# Patient Record
Sex: FEMALE | Race: WHITE | URBAN - METROPOLITAN AREA
[De-identification: names, ages, dates, MRNs, and addresses within clinical notes are randomized per-mention and may not be internally consistent; named-entity substitution may affect disease eponyms.]

---

## 2021-03-15 ENCOUNTER — INPATIENT (INPATIENT)
Facility: HOSPITAL | Age: 71
LOS: 7 days | Discharge: HOME HEALTH | DRG: 853 | End: 2021-03-23
Attending: INTERNAL MEDICINE | Admitting: INTERNAL MEDICINE
Payer: MEDICARE

## 2021-03-15 VITALS
OXYGEN SATURATION: 97 % | HEART RATE: 135 BPM | WEIGHT: 179.9 LBS | RESPIRATION RATE: 18 BRPM | HEIGHT: 63 IN | SYSTOLIC BLOOD PRESSURE: 105 MMHG | DIASTOLIC BLOOD PRESSURE: 64 MMHG | TEMPERATURE: 98 F

## 2021-03-15 DIAGNOSIS — I48.91 UNSPECIFIED ATRIAL FIBRILLATION: ICD-10-CM

## 2021-03-15 DIAGNOSIS — A41.9 SEPSIS, UNSPECIFIED ORGANISM: ICD-10-CM

## 2021-03-15 LAB
ALBUMIN SERPL ELPH-MCNC: 2.4 G/DL — LOW (ref 3.3–5)
ALP SERPL-CCNC: 94 U/L — SIGNIFICANT CHANGE UP (ref 40–120)
ALT FLD-CCNC: 45 U/L — SIGNIFICANT CHANGE UP (ref 12–78)
ANION GAP SERPL CALC-SCNC: 13 MMOL/L — SIGNIFICANT CHANGE UP (ref 5–17)
APPEARANCE UR: CLEAR — SIGNIFICANT CHANGE UP
APTT BLD: 29.2 SEC — SIGNIFICANT CHANGE UP (ref 27.5–35.5)
AST SERPL-CCNC: 83 U/L — HIGH (ref 15–37)
BASOPHILS # BLD AUTO: 0.05 K/UL — SIGNIFICANT CHANGE UP (ref 0–0.2)
BASOPHILS NFR BLD AUTO: 0.3 % — SIGNIFICANT CHANGE UP (ref 0–2)
BILIRUB SERPL-MCNC: 1.2 MG/DL — SIGNIFICANT CHANGE UP (ref 0.2–1.2)
BILIRUB UR-MCNC: NEGATIVE — SIGNIFICANT CHANGE UP
BUN SERPL-MCNC: 14 MG/DL — SIGNIFICANT CHANGE UP (ref 7–23)
CALCIUM SERPL-MCNC: 8.7 MG/DL — SIGNIFICANT CHANGE UP (ref 8.5–10.1)
CHLORIDE SERPL-SCNC: 93 MMOL/L — LOW (ref 96–108)
CO2 SERPL-SCNC: 22 MMOL/L — SIGNIFICANT CHANGE UP (ref 22–31)
COLOR SPEC: YELLOW — SIGNIFICANT CHANGE UP
CREAT SERPL-MCNC: 1.68 MG/DL — HIGH (ref 0.5–1.3)
DIFF PNL FLD: NEGATIVE — SIGNIFICANT CHANGE UP
EOSINOPHIL # BLD AUTO: 0 K/UL — SIGNIFICANT CHANGE UP (ref 0–0.5)
EOSINOPHIL NFR BLD AUTO: 0 % — SIGNIFICANT CHANGE UP (ref 0–6)
GLUCOSE SERPL-MCNC: 143 MG/DL — HIGH (ref 70–99)
GLUCOSE UR QL: NEGATIVE MG/DL — SIGNIFICANT CHANGE UP
HCT VFR BLD CALC: 43.1 % — SIGNIFICANT CHANGE UP (ref 34.5–45)
HGB BLD-MCNC: 14.5 G/DL — SIGNIFICANT CHANGE UP (ref 11.5–15.5)
IMM GRANULOCYTES NFR BLD AUTO: 1 % — SIGNIFICANT CHANGE UP (ref 0–1.5)
INR BLD: 1.63 RATIO — HIGH (ref 0.88–1.16)
KETONES UR-MCNC: NEGATIVE — SIGNIFICANT CHANGE UP
LEUKOCYTE ESTERASE UR-ACNC: NEGATIVE — SIGNIFICANT CHANGE UP
LYMPHOCYTES # BLD AUTO: 0.52 K/UL — LOW (ref 1–3.3)
LYMPHOCYTES # BLD AUTO: 2.9 % — LOW (ref 13–44)
MCHC RBC-ENTMCNC: 30.6 PG — SIGNIFICANT CHANGE UP (ref 27–34)
MCHC RBC-ENTMCNC: 33.6 GM/DL — SIGNIFICANT CHANGE UP (ref 32–36)
MCV RBC AUTO: 90.9 FL — SIGNIFICANT CHANGE UP (ref 80–100)
MONOCYTES # BLD AUTO: 0.42 K/UL — SIGNIFICANT CHANGE UP (ref 0–0.9)
MONOCYTES NFR BLD AUTO: 2.3 % — SIGNIFICANT CHANGE UP (ref 2–14)
NEUTROPHILS # BLD AUTO: 16.74 K/UL — HIGH (ref 1.8–7.4)
NEUTROPHILS NFR BLD AUTO: 93.5 % — HIGH (ref 43–77)
NITRITE UR-MCNC: NEGATIVE — SIGNIFICANT CHANGE UP
PH UR: 6.5 — SIGNIFICANT CHANGE UP (ref 5–8)
PLATELET # BLD AUTO: 333 K/UL — SIGNIFICANT CHANGE UP (ref 150–400)
POTASSIUM SERPL-MCNC: 3.8 MMOL/L — SIGNIFICANT CHANGE UP (ref 3.5–5.3)
POTASSIUM SERPL-SCNC: 3.8 MMOL/L — SIGNIFICANT CHANGE UP (ref 3.5–5.3)
PROT SERPL-MCNC: 8 GM/DL — SIGNIFICANT CHANGE UP (ref 6–8.3)
PROT UR-MCNC: NEGATIVE MG/DL — SIGNIFICANT CHANGE UP
PROTHROM AB SERPL-ACNC: 18.6 SEC — HIGH (ref 10.6–13.6)
RBC # BLD: 4.74 M/UL — SIGNIFICANT CHANGE UP (ref 3.8–5.2)
RBC # FLD: 13.2 % — SIGNIFICANT CHANGE UP (ref 10.3–14.5)
SARS-COV-2 RNA SPEC QL NAA+PROBE: SIGNIFICANT CHANGE UP
SODIUM SERPL-SCNC: 128 MMOL/L — LOW (ref 135–145)
SP GR SPEC: 1 — LOW (ref 1.01–1.02)
TSH SERPL-MCNC: 2.66 UU/ML — SIGNIFICANT CHANGE UP (ref 0.34–4.82)
UROBILINOGEN FLD QL: NEGATIVE MG/DL — SIGNIFICANT CHANGE UP
WBC # BLD: 17.91 K/UL — HIGH (ref 3.8–10.5)
WBC # FLD AUTO: 17.91 K/UL — HIGH (ref 3.8–10.5)

## 2021-03-15 PROCEDURE — 99291 CRITICAL CARE FIRST HOUR: CPT | Mod: CS

## 2021-03-15 PROCEDURE — 88173 CYTOPATH EVAL FNA REPORT: CPT

## 2021-03-15 PROCEDURE — 71260 CT THORAX DX C+: CPT

## 2021-03-15 PROCEDURE — 97116 GAIT TRAINING THERAPY: CPT | Mod: GP

## 2021-03-15 PROCEDURE — 93306 TTE W/DOPPLER COMPLETE: CPT

## 2021-03-15 PROCEDURE — 71045 X-RAY EXAM CHEST 1 VIEW: CPT | Mod: 26

## 2021-03-15 PROCEDURE — 86769 SARS-COV-2 COVID-19 ANTIBODY: CPT

## 2021-03-15 PROCEDURE — 71045 X-RAY EXAM CHEST 1 VIEW: CPT

## 2021-03-15 PROCEDURE — 76856 US EXAM PELVIC COMPLETE: CPT

## 2021-03-15 PROCEDURE — 88305 TISSUE EXAM BY PATHOLOGIST: CPT

## 2021-03-15 PROCEDURE — 81003 URINALYSIS AUTO W/O SCOPE: CPT

## 2021-03-15 PROCEDURE — 76830 TRANSVAGINAL US NON-OB: CPT

## 2021-03-15 PROCEDURE — 82378 CARCINOEMBRYONIC ANTIGEN: CPT

## 2021-03-15 PROCEDURE — 86803 HEPATITIS C AB TEST: CPT

## 2021-03-15 PROCEDURE — 74183 MRI ABD W/O CNTR FLWD CNTR: CPT

## 2021-03-15 PROCEDURE — 86301 IMMUNOASSAY TUMOR CA 19-9: CPT

## 2021-03-15 PROCEDURE — 87086 URINE CULTURE/COLONY COUNT: CPT

## 2021-03-15 PROCEDURE — 99223 1ST HOSP IP/OBS HIGH 75: CPT

## 2021-03-15 PROCEDURE — U0005: CPT

## 2021-03-15 PROCEDURE — A9579: CPT

## 2021-03-15 PROCEDURE — 84145 PROCALCITONIN (PCT): CPT

## 2021-03-15 PROCEDURE — 83735 ASSAY OF MAGNESIUM: CPT

## 2021-03-15 PROCEDURE — 80048 BASIC METABOLIC PNL TOTAL CA: CPT

## 2021-03-15 PROCEDURE — 10009 FNA BX W/CT GDN 1ST LES: CPT

## 2021-03-15 PROCEDURE — 80053 COMPREHEN METABOLIC PANEL: CPT

## 2021-03-15 PROCEDURE — 87070 CULTURE OTHR SPECIMN AEROBIC: CPT

## 2021-03-15 PROCEDURE — 36415 COLL VENOUS BLD VENIPUNCTURE: CPT

## 2021-03-15 PROCEDURE — C1769: CPT

## 2021-03-15 PROCEDURE — 74177 CT ABD & PELVIS W/CONTRAST: CPT | Mod: 26

## 2021-03-15 PROCEDURE — 93010 ELECTROCARDIOGRAM REPORT: CPT

## 2021-03-15 PROCEDURE — 83605 ASSAY OF LACTIC ACID: CPT

## 2021-03-15 PROCEDURE — 86304 IMMUNOASSAY TUMOR CA 125: CPT

## 2021-03-15 PROCEDURE — 36597 REPOSITION VENOUS CATHETER: CPT

## 2021-03-15 PROCEDURE — 0031A: CPT

## 2021-03-15 PROCEDURE — 85025 COMPLETE CBC W/AUTO DIFF WBC: CPT

## 2021-03-15 PROCEDURE — U0003: CPT

## 2021-03-15 PROCEDURE — 85027 COMPLETE CBC AUTOMATED: CPT

## 2021-03-15 PROCEDURE — 76000 FLUOROSCOPY <1 HR PHYS/QHP: CPT

## 2021-03-15 PROCEDURE — 97161 PT EVAL LOW COMPLEX 20 MIN: CPT | Mod: GP

## 2021-03-15 PROCEDURE — 74177 CT ABD & PELVIS W/CONTRAST: CPT

## 2021-03-15 PROCEDURE — 87040 BLOOD CULTURE FOR BACTERIA: CPT

## 2021-03-15 RX ORDER — SOD SULF/SODIUM/NAHCO3/KCL/PEG
2000 SOLUTION, RECONSTITUTED, ORAL ORAL ONCE
Refills: 0 | Status: COMPLETED | OUTPATIENT
Start: 2021-03-15 | End: 2021-03-15

## 2021-03-15 RX ORDER — PANTOPRAZOLE SODIUM 20 MG/1
40 TABLET, DELAYED RELEASE ORAL ONCE
Refills: 0 | Status: DISCONTINUED | OUTPATIENT
Start: 2021-03-15 | End: 2021-03-15

## 2021-03-15 RX ORDER — SODIUM CHLORIDE 9 MG/ML
1000 INJECTION INTRAMUSCULAR; INTRAVENOUS; SUBCUTANEOUS
Refills: 0 | Status: DISCONTINUED | OUTPATIENT
Start: 2021-03-15 | End: 2021-03-15

## 2021-03-15 RX ORDER — ONDANSETRON 8 MG/1
4 TABLET, FILM COATED ORAL EVERY 6 HOURS
Refills: 0 | Status: DISCONTINUED | OUTPATIENT
Start: 2021-03-15 | End: 2021-03-23

## 2021-03-15 RX ORDER — METOPROLOL TARTRATE 50 MG
25 TABLET ORAL
Refills: 0 | Status: DISCONTINUED | OUTPATIENT
Start: 2021-03-15 | End: 2021-03-15

## 2021-03-15 RX ORDER — SODIUM CHLORIDE 9 MG/ML
1000 INJECTION INTRAMUSCULAR; INTRAVENOUS; SUBCUTANEOUS
Refills: 0 | Status: DISCONTINUED | OUTPATIENT
Start: 2021-03-15 | End: 2021-03-17

## 2021-03-15 RX ORDER — SODIUM CHLORIDE 9 MG/ML
2500 INJECTION, SOLUTION INTRAVENOUS ONCE
Refills: 0 | Status: COMPLETED | OUTPATIENT
Start: 2021-03-15 | End: 2021-03-15

## 2021-03-15 RX ORDER — PIPERACILLIN AND TAZOBACTAM 4; .5 G/20ML; G/20ML
3.38 INJECTION, POWDER, LYOPHILIZED, FOR SOLUTION INTRAVENOUS ONCE
Refills: 0 | Status: COMPLETED | OUTPATIENT
Start: 2021-03-15 | End: 2021-03-15

## 2021-03-15 RX ADMIN — SODIUM CHLORIDE 2500 MILLILITER(S): 9 INJECTION, SOLUTION INTRAVENOUS at 12:59

## 2021-03-15 RX ADMIN — Medication 2000 MILLILITER(S): at 20:54

## 2021-03-15 RX ADMIN — PIPERACILLIN AND TAZOBACTAM 200 GRAM(S): 4; .5 INJECTION, POWDER, LYOPHILIZED, FOR SOLUTION INTRAVENOUS at 14:40

## 2021-03-15 RX ADMIN — SODIUM CHLORIDE 100 MILLILITER(S): 9 INJECTION INTRAMUSCULAR; INTRAVENOUS; SUBCUTANEOUS at 17:11

## 2021-03-15 NOTE — CONSULT NOTE ADULT - SUBJECTIVE AND OBJECTIVE BOX
HPI:  71 y/o female with a PMHx of Crohns presents to the ED for evaluation. Pt reports having cough, n/v/d and feeling fatigued over the last few days. Pt fearful of being admitted and doesn't have a PCP; her  tells me he made her come, due to the fact that she was v weak, not eating and he was concerned.  Pt has new onset AF was RVR when she arrived and I believe this was the main reason her her feeling weak, she needs a CT abd to r/o IBD flare, doubt any ischemic colitis ( no blood with the diarrhea, but in view of the new AF worth ruling out).   Case d/w ; pt hasn't been on any meds for years, doesn't have a GI I can call.  Pt tells me she stopped eating weeks ago. She is clearly dehydrated    (15 Mar 2021 14:35)  -----------------  No bleeding  Last colonoscopy 12 years ago with Dr. Barragan.    PAST MEDICAL & SURGICAL HISTORY:  Crohn&#x27;s disease        Home Medications:  Refresh ophthalmic solution: 1 drop(s) to each affected eye once a day, As Needed - for dry eyes (15 Mar 2021 14:38)      MEDICATIONS  (STANDING):  polyethylene glycol/electrolyte Solution 2000 milliLiter(s) Oral once  sodium chloride 0.9%. 1000 milliLiter(s) (100 mL/Hr) IV Continuous <Continuous>    MEDICATIONS  (PRN):  aluminum hydroxide/magnesium hydroxide/simethicone Suspension 30 milliLiter(s) Oral every 4 hours PRN Dyspepsia  ondansetron Injectable 4 milliGRAM(s) IV Push every 6 hours PRN Nausea      Allergies    No Known Allergies    Intolerances        SOCIAL HISTORY:    FAMILY HISTORY:      ROS  As above  Otherwise unremarkable    Vital Signs Last 24 Hrs  T(C): 37.1 (15 Mar 2021 16:12), Max: 37.1 (15 Mar 2021 16:12)  T(F): 98.7 (15 Mar 2021 16:12), Max: 98.7 (15 Mar 2021 16:12)  HR: 111 (15 Mar 2021 16:12) (111 - 135)  BP: 104/74 (15 Mar 2021 16:12) (94/65 - 105/64)  BP(mean): 84 (15 Mar 2021 16:12) (70 - 84)  RR: 15 (15 Mar 2021 14:36) (15 - 29)  SpO2: 92% (15 Mar 2021 14:36) (92% - 97%)    Constitutional: NAD, well-developed  Respiratory: CTAB  Cardiovascular: S1 and S2, RRR  Gastrointestinal: BS+, soft, NT/ND  Extremities: No peripheral edema  Psychiatric: Normal mood, normal affect  Skin: No rashes    LABS:                        14.5   17.91 )-----------( 333      ( 15 Mar 2021 12:48 )             43.1     03-15    128<L>  |  93<L>  |  14  ----------------------------<  143<H>  3.8   |  22  |  1.68<H>    Ca    8.7      15 Mar 2021 12:48    TPro  8.0  /  Alb  2.4<L>  /  TBili  1.2  /  DBili  x   /  AST  83<H>  /  ALT  45  /  AlkPhos  94  03-15    PT/INR - ( 15 Mar 2021 12:48 )   PT: 18.6 sec;   INR: 1.63 ratio         PTT - ( 15 Mar 2021 12:48 )  PTT:29.2 sec  LIVER FUNCTIONS - ( 15 Mar 2021 12:48 )  Alb: 2.4 g/dL / Pro: 8.0 gm/dL / ALK PHOS: 94 U/L / ALT: 45 U/L / AST: 83 U/L / GGT: x             RADIOLOGY & ADDITIONAL STUDIES: [Awake] : awake [Alert] : alert [Soft] : soft [Oriented x3] : oriented to person, place, and time [No Bleeding] : there was no active vaginal bleeding [Normal] : uterus [Uterine Adnexae] : were not tender and not enlarged [No Tenderness] : no rectal tenderness [Nl Sphincter Tone] : normal sphincter tone [Acute Distress] : no acute distress [Mass] : no breast mass [Nipple Discharge] : no nipple discharge [Tender] : non tender [Axillary LAD] : no axillary lymphadenopathy

## 2021-03-15 NOTE — ED PROVIDER NOTE - PHYSICAL EXAMINATION
Constitutional: Elderly female laying n bed in mild distress AAOx3  Eyes: PERRLA EOMI  Head: Normocephalic atraumatic  Mouth: MMM. Oropharynx dry  Cardiac: regular rate   Resp: Lungs CTAB  GI: Abd s/nt/nd, no rebound or guarding.  Neuro: awake, alert, moving all extremities, cranial nerves 2-12 intact, sensation intact, no dysmetria.  Skin: No rashes

## 2021-03-15 NOTE — ED ADULT NURSE NOTE - OBJECTIVE STATEMENT
pt BIBA c/o n/v/d x2wks. pt denies abd pain, chest pain, fevers at home, and SOB. pt AOx4, breathing symmetrical and unlabored, #20 IV inserted into L. AC, bloods drawn and sent to lab, EKG completed, IVF infusing as ordered, cardiac monitoring in place, pt in no acute distress at this time.

## 2021-03-15 NOTE — H&P ADULT - HISTORY OF PRESENT ILLNESS
69 y/o female with a PMHx of Crohns presents to the ED for evaluation. Pt reports having cough, n/v/d and feeling fatigued over the last few days. Denies fevers. No recent abx use. No hx of Cdiff. No known sick contacts. No other complaints at this time.   71 y/o female with a PMHx of Crohns presents to the ED for evaluation. Pt reports having cough, n/v/d and feeling fatigued over the last few days. Pt fearful of being admitted and doesn't have a PCP; her  tells me he made her come, due to the fact that she was v weak, not eating and he was concerned.  Pt has new onset AF was RVR when she arrived and I believe this was the main reason her her feeling weak, she needs a CT abd to r/o IBD flare, doubt any ischemic colitis ( no blood with the diarrhea, but in view of the new AF worth ruling out).   Case d/w ; pt hasn't been on any meds for years, doesn't have a GI I can call.  Pt tells me she stopped eating weeks ago. She is clearly dehydrated

## 2021-03-15 NOTE — H&P ADULT - ASSESSMENT
* Weakness, nausea, diarrhea, abdominal discomfort not pain  r/o Crohn's exacerbation vs ischemic colitis  CT abd PO contrast only  c/w Zosyn for now  check ESR if elevated this could be Crohn's  IVF, rate at 80 in case there is CHF component    * New onset AF, vs chronic undiagnosed patient doesn't have a PCP; intermittent RVR  suspect dehydration to contribute to it  start low dose BB if rate persistently high  TTE  cardio consult  decide about the AC if HH stable  pt looks very dehydrated to see HH in am I am sure HH will drop after IVF    * HILARY, hyponatremia sec to dehydration  NS@80    Code status not addressed pt is too anxious for me to start discussion at this time

## 2021-03-15 NOTE — CONSULT NOTE ADULT - SUBJECTIVE AND OBJECTIVE BOX
HPI:  69 y/o female with a PMHx of Crohns presents to the ED for evaluation. Pt reports having cough, n/v/d and feeling fatigued over the last few days. Pt fearful of being admitted and doesn't have a PCP; her  tells me he made her come, due to the fact that she was v weak, not eating and he was concerned.  Pt has new onset AF was RVR when she arrived and I believe this was the main reason her her feeling weak, she needs a CT abd to r/o IBD flare, doubt any ischemic colitis ( no blood with the diarrhea, but in view of the new AF worth ruling out).   Case d/w ; pt hasn't been on any meds for years, doesn't have a GI I can call.  Pt tells me she stopped eating weeks ago. She is clearly dehydrated    (15 Mar 2021 14:35)    admitted for nausea, vomiting, weakness  concerning for Crohn's exacerbation v. ischemic colitis.  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  Will likely need colonoscopy.  Consulted for newly diagnosed afib on ECG on admit.  Pt has mild RVR in 110s w/ afib may be due to acute illness.  Reports fatigue but no dyspnea, chest pain,.  Some palpitations intermittent lasting minutes every few days on questioning.  No chest pain.    PAST MEDICAL AND SURGICAL HISTORY:  Crohn&#x27;s disease    ALLERGIES:  Allergies  No Known Allergies  Intolerances    SOCIAL HISTORY:  lives with , neg smoking, etoh, idu (15 Mar 2021 14:35)      FAMILY  HISTORY:  neg      MEDICATIONS:  OUTPATIENT:  Home Medications:  Refresh ophthalmic solution: 1 drop(s) to each affected eye once a day, As Needed - for dry eyes (15 Mar 2021 14:38)        INPATIENT:  MEDICATIONS  (STANDING):  sodium chloride 0.9%. 1000 milliLiter(s) (100 mL/Hr) IV Continuous <Continuous>    MEDICATIONS  (PRN):  aluminum hydroxide/magnesium hydroxide/simethicone Suspension 30 milliLiter(s) Oral every 4 hours PRN Dyspepsia  ondansetron Injectable 4 milliGRAM(s) IV Push every 6 hours PRN Nausea    MEDICATIONS  (PRN):  aluminum hydroxide/magnesium hydroxide/simethicone Suspension 30 milliLiter(s) Oral every 4 hours PRN Dyspepsia  ondansetron Injectable 4 milliGRAM(s) IV Push every 6 hours PRN Nausea    REVIEW OF SYSTEMS:    CONSTITUTIONAL: No weakness, fevers or chills  EYES/ENT: No visual changes;  No vertigo or throat pain   NECK: No pain or stiffness  RESPIRATORY: No cough, wheezing, hemoptysis; No shortness of breath  CARDIOVASCULAR: No chest pain or palpitations  GASTROINTESTINAL: No abdominal or epigastric pain. No nausea, vomiting, or hematemesis; No diarrhea or constipation. No melena or hematochezia.  GENITOURINARY: No dysuria, frequency or hematuria  NEUROLOGICAL: No numbness or weakness  SKIN: No itching, burning, rashes, or lesions   All other review of systems is negative unless indicated above    Vital Signs Last 24 Hrs  T(C): 37.1 (15 Mar 2021 16:12), Max: 37.1 (15 Mar 2021 16:12)  T(F): 98.7 (15 Mar 2021 16:12), Max: 98.7 (15 Mar 2021 16:12)  HR: 111 (15 Mar 2021 16:12) (111 - 135)  BP: 104/74 (15 Mar 2021 16:12) (94/65 - 105/64)  BP(mean): 84 (15 Mar 2021 16:12) (70 - 84)  RR: 15 (15 Mar 2021 14:36) (15 - 29)  SpO2: 92% (15 Mar 2021 14:36) (92% - 97%)    I&O's Summary      PHYSICAL EXAM:    Constitutional: NAD, awake and alert,   HEENT: PERR, EOMI,  No oral cyananosis.  Neck:  supple,  No JVD  Respiratory: Breath sounds are clear bilaterally, No wheezing, rales or rhonchi  Cardiovascular: S1 and S2, iregular rate and rhythm, no Murmurs, gallops or rubs  Gastrointestinal: Bowel Sounds present, soft, nontender.   Extremities: No peripheral edema. No clubbing or cyanosis.  Vascular: 2+ peripheral pulses  Neurological: A/O x 3, no focal deficits        LABS: All Labs Reviewed:                        14.5   17.91 )-----------( 333      ( 15 Mar 2021 12:48 )             43.1     15 Mar 2021 12:48    128    |  93     |  14     ----------------------------<  143    3.8     |  22     |  1.68     Ca    8.7        15 Mar 2021 12:48    TPro  8.0    /  Alb  2.4    /  TBili  1.2    /  DBili  x      /  AST  83     /  ALT  45     /  AlkPhos  94     15 Mar 2021 12:48    PT/INR - ( 15 Mar 2021 12:48 )   PT: 18.6 sec;   INR: 1.63 ratio         PTT - ( 15 Mar 2021 12:48 )  PTT:29.2 sec      Blood Culture:       ===============================  EKG:  < from: 12 Lead ECG (03.15.21 @ 12:36) >  Diagnosis Line Atrial fibrillation with rapid ventricular response  ST & T wave abnormality, consider inferior ischemia  Abnormal ECG  When compared with ECG of 15-MAR-2021 12:35,  Nonspecific T wave abnormality has replaced inverted T waves in Lateral leads  Confirmed by MD CASPER, YOLY ([id number]) on 3/15/2021 3:50:56 PM    < end of copied text >  ===============================  RADIOLOGY:  < from: Xray Chest 1 View- PORTABLE-Urgent (Xray Chest 1 View- PORTABLE-Urgent .) (03.15.21 @ 13:32) >  Comparison: 03/15/2021    AP radiograph of the chest demonstrates no evidence of infiltrate, pleural effusion or vascular congestion. No atelectasis is seen. The cardiac silhouette is mildly enlarged in size. Osseous structures are intact.    Impression: No active pulmonary disease. Mild cardiomegaly.    JANIS COSTA MD; AttendingRadiologist  This document has been electronically signed. Mar 15 2021  4:57PM    < end of copied text >    < from: CT Abdomen and Pelvis w/ IV Cont (03.15.21 @ 14:27) >    IMPRESSION:  Soft tissue mass centered around the right adnexa appears to be emanating from the appendix/cecum and secondarily involving the right adnexa concerning for neoplasm which is likely appendiceal/cecal in origin versus right ovarian. Consideration for colonoscopy for further evaluation.    Indeterminate hepatic lesion for which MRI is recommendedfor further evaluation. Right paracolic gutter peritoneal nodularity for which carcinomatosis cannot be excluded.    ===============================

## 2021-03-15 NOTE — CONSULT NOTE ADULT - PROBLEM SELECTOR RECOMMENDATION 9
newly diagnosed.  CHADS2-VASc=1-2 (>65,F).  Check Echo. Check TSH.  Check noctural O2 sats given BMI 31.9.  No ETOHism.  HRs 110s may be related to acute illness, OK to hold BB for now.  IM team hold anticoag pending trend of H&H, would favor long term initiation of anticoag given CHADS2-VASc>1.

## 2021-03-15 NOTE — ED PROVIDER NOTE - CLINICAL SUMMARY MEDICAL DECISION MAKING FREE TEXT BOX
69 y/o female with hx of Crohns presents with n/v/d and weakness. Differential includes, colitis, gastroenteritis, Crohns flare, COVID, Plan: labs, rehydrate, CT abd/pelvis, chest XR, COVID swab, reassess closely.

## 2021-03-15 NOTE — H&P ADULT - NSHPPHYSICALEXAM_GEN_ALL_CORE
Constitutional: Elderly female laying n bed in mild distress AAOx3  Eyes: PERRLA EOMI  Head: Normocephalic atraumatic  Mouth: MMM. Oropharynx dry  Cardiac: irregular s1s2 no m/g/r  Resp: Lungs CTAB  GI: Abd s/nt/nd, no rebound or guarding.  Neuro: awake, alert, moving all extremities, cranial nerves 2-12 intact, sensation intact, no dysmetria.  Skin: No rashes normal...

## 2021-03-15 NOTE — H&P ADULT - NSHPLABSRESULTS_GEN_ALL_CORE
14.5   17.91 )-----------( 333      ( 15 Mar 2021 12:48 )             43.1   03-15    128<L>  |  93<L>  |  14  ----------------------------<  143<H>  3.8   |  22  |  1.68<H>    Ca    8.7      15 Mar 2021 12:48    TPro  8.0  /  Alb  2.4<L>  /  TBili  1.2  /  DBili  x   /  AST  83<H>  /  ALT  45  /  AlkPhos  94  03-15

## 2021-03-15 NOTE — ED ADULT TRIAGE NOTE - CHIEF COMPLAINT QUOTE
pt presents to ED with complaints of lethargy, weakness, and general feeling of being unwell. pt has hx of Crohns and having a flare x 2 weeks.

## 2021-03-15 NOTE — ED PROVIDER NOTE - OBJECTIVE STATEMENT
71 y/o female with a PMHx of Crohns presents to the ED for evaluation. Pt reports having cough, n/v/d and feeling fatigued over the last few days. Denies fevers. No recent abx use. No hx of Cdiff. No known sick contacts. No other complaints at this time.

## 2021-03-15 NOTE — ED PROVIDER NOTE - CARE PLAN
Principal Discharge DX:	Severe sepsis  Secondary Diagnosis:	GI bleed   Principal Discharge DX:	Severe sepsis

## 2021-03-15 NOTE — ED PROVIDER NOTE - CRITICAL CARE ATTENDING CONTRIBUTION TO CARE
Jyotsna MELENDEZ M.D. independently provided 35 minutes of critical care including but not limited to talking to consultants, speaking with patient and family and reviewing lab and radiology results.

## 2021-03-15 NOTE — INPATIENT CERTIFICATION FOR MEDICARE PATIENTS - NS ICMP CERT SIG IND
Airway  Performed by: Jhonny Herr MD  Authorized by: Jhonny Herr MD     Final Airway Type:  Endotracheal airway  Final Endotracheal Airway*:  ETT  ETT Size (mm)*:  7.5  Cuff*:  Regular  Technique Used for Successful ETT Placement:  Video laryngoscopy  Devices/Methods Used in Placement*:  Mask  Intubation Procedure*:  Preoxygenation, ETCO2, Atraumatic, Dentition Unchanged and Phaynx Clear  Insertion Site:  Oral  Blade Type*:  MAC  Blade Size*:  3  Placement Verified by: auscultation and capnometry    Glottic View*:  1 - full view of glottis  Attempts*:  1   Patient Identified, Procedure confirmed, Emergency equipment available and Safety protocols followed  Location:  OR  Urgency:  Elective  Difficult Airway: No    Indications for Airway Management:  Anesthesia  Mask Difficulty Assessment:  0 - not attempted  Performed By:  Anesthesiologist  Anesthesiologist:  Jhonny Herr MD        
I certify as stated above.

## 2021-03-15 NOTE — CHART NOTE - NSCHARTNOTEFT_GEN_A_CORE
CT resulted: colon vs ovarian Ca with carcinomatosis; send ; GI aware; case d/w ; pt is v anxious I told her there are findings on the CT that will require GI eval and colonoscopy. Pt also got IV contrast with creatinine 1.68 to do daily BMP and hydrate her; I will dc BB for now

## 2021-03-15 NOTE — ED PROVIDER NOTE - NS ED ROS FT
Constitutional: No fever or chills +fatigue  Eyes: No visual changes  HEENT: No throat pain  CV: No chest pain  Resp: No SOB, +cough  GI: No abd pain. +n/v/d  : No dysuria  MSK: No musculoskeletal pain  Skin: No rash  Neuro: No headache

## 2021-03-16 DIAGNOSIS — R93.1 ABNORMAL FINDINGS ON DIAGNOSTIC IMAGING OF HEART AND CORONARY CIRCULATION: ICD-10-CM

## 2021-03-16 LAB
ANION GAP SERPL CALC-SCNC: 8 MMOL/L — SIGNIFICANT CHANGE UP (ref 5–17)
ANION GAP SERPL CALC-SCNC: 9 MMOL/L — SIGNIFICANT CHANGE UP (ref 5–17)
BUN SERPL-MCNC: 15 MG/DL — SIGNIFICANT CHANGE UP (ref 7–23)
BUN SERPL-MCNC: 16 MG/DL — SIGNIFICANT CHANGE UP (ref 7–23)
CALCIUM SERPL-MCNC: 7.9 MG/DL — LOW (ref 8.5–10.1)
CALCIUM SERPL-MCNC: 8.4 MG/DL — LOW (ref 8.5–10.1)
CANCER AG125 SERPL-ACNC: 46 U/ML — HIGH
CEA SERPL-MCNC: 3.6 NG/ML — SIGNIFICANT CHANGE UP (ref 0–3.8)
CHLORIDE SERPL-SCNC: 108 MMOL/L — SIGNIFICANT CHANGE UP (ref 96–108)
CHLORIDE SERPL-SCNC: 109 MMOL/L — HIGH (ref 96–108)
CO2 SERPL-SCNC: 24 MMOL/L — SIGNIFICANT CHANGE UP (ref 22–31)
CO2 SERPL-SCNC: 25 MMOL/L — SIGNIFICANT CHANGE UP (ref 22–31)
CREAT SERPL-MCNC: 0.76 MG/DL — SIGNIFICANT CHANGE UP (ref 0.5–1.3)
CREAT SERPL-MCNC: 0.92 MG/DL — SIGNIFICANT CHANGE UP (ref 0.5–1.3)
CULTURE RESULTS: SIGNIFICANT CHANGE UP
GLUCOSE SERPL-MCNC: 103 MG/DL — HIGH (ref 70–99)
GLUCOSE SERPL-MCNC: 122 MG/DL — HIGH (ref 70–99)
HCT VFR BLD CALC: 37.4 % — SIGNIFICANT CHANGE UP (ref 34.5–45)
HCV AB S/CO SERPL IA: 0.15 S/CO — SIGNIFICANT CHANGE UP (ref 0–0.99)
HCV AB SERPL-IMP: SIGNIFICANT CHANGE UP
HGB BLD-MCNC: 12.4 G/DL — SIGNIFICANT CHANGE UP (ref 11.5–15.5)
MCHC RBC-ENTMCNC: 30.2 PG — SIGNIFICANT CHANGE UP (ref 27–34)
MCHC RBC-ENTMCNC: 33.2 GM/DL — SIGNIFICANT CHANGE UP (ref 32–36)
MCV RBC AUTO: 91 FL — SIGNIFICANT CHANGE UP (ref 80–100)
PLATELET # BLD AUTO: 322 K/UL — SIGNIFICANT CHANGE UP (ref 150–400)
POTASSIUM SERPL-MCNC: 2.8 MMOL/L — CRITICAL LOW (ref 3.5–5.3)
POTASSIUM SERPL-MCNC: 3.1 MMOL/L — LOW (ref 3.5–5.3)
POTASSIUM SERPL-SCNC: 2.8 MMOL/L — CRITICAL LOW (ref 3.5–5.3)
POTASSIUM SERPL-SCNC: 3.1 MMOL/L — LOW (ref 3.5–5.3)
RBC # BLD: 4.11 M/UL — SIGNIFICANT CHANGE UP (ref 3.8–5.2)
RBC # FLD: 13.3 % — SIGNIFICANT CHANGE UP (ref 10.3–14.5)
SARS-COV-2 IGG SERPL QL IA: NEGATIVE — SIGNIFICANT CHANGE UP
SARS-COV-2 IGM SERPL IA-ACNC: 0.07 INDEX — SIGNIFICANT CHANGE UP
SODIUM SERPL-SCNC: 140 MMOL/L — SIGNIFICANT CHANGE UP (ref 135–145)
SODIUM SERPL-SCNC: 143 MMOL/L — SIGNIFICANT CHANGE UP (ref 135–145)
SPECIMEN SOURCE: SIGNIFICANT CHANGE UP
WBC # BLD: 12.04 K/UL — HIGH (ref 3.8–10.5)
WBC # FLD AUTO: 12.04 K/UL — HIGH (ref 3.8–10.5)

## 2021-03-16 PROCEDURE — 99233 SBSQ HOSP IP/OBS HIGH 50: CPT

## 2021-03-16 PROCEDURE — 93306 TTE W/DOPPLER COMPLETE: CPT | Mod: 26

## 2021-03-16 RX ORDER — METOPROLOL TARTRATE 50 MG
12.5 TABLET ORAL
Refills: 0 | Status: DISCONTINUED | OUTPATIENT
Start: 2021-03-16 | End: 2021-03-17

## 2021-03-16 RX ORDER — POTASSIUM CHLORIDE 20 MEQ
40 PACKET (EA) ORAL EVERY 4 HOURS
Refills: 0 | Status: COMPLETED | OUTPATIENT
Start: 2021-03-16 | End: 2021-03-16

## 2021-03-16 RX ORDER — METOPROLOL TARTRATE 50 MG
25 TABLET ORAL
Refills: 0 | Status: DISCONTINUED | OUTPATIENT
Start: 2021-03-16 | End: 2021-03-16

## 2021-03-16 RX ORDER — POTASSIUM CHLORIDE 20 MEQ
40 PACKET (EA) ORAL ONCE
Refills: 0 | Status: COMPLETED | OUTPATIENT
Start: 2021-03-16 | End: 2021-03-16

## 2021-03-16 RX ORDER — POTASSIUM CHLORIDE 20 MEQ
10 PACKET (EA) ORAL
Refills: 0 | Status: COMPLETED | OUTPATIENT
Start: 2021-03-16 | End: 2021-03-16

## 2021-03-16 RX ADMIN — Medication 40 MILLIEQUIVALENT(S): at 13:07

## 2021-03-16 RX ADMIN — SODIUM CHLORIDE 100 MILLILITER(S): 9 INJECTION INTRAMUSCULAR; INTRAVENOUS; SUBCUTANEOUS at 00:13

## 2021-03-16 RX ADMIN — Medication 40 MILLIEQUIVALENT(S): at 18:14

## 2021-03-16 RX ADMIN — Medication 100 MILLIEQUIVALENT(S): at 13:06

## 2021-03-16 RX ADMIN — Medication 100 MILLIEQUIVALENT(S): at 11:34

## 2021-03-16 RX ADMIN — Medication 40 MILLIEQUIVALENT(S): at 21:12

## 2021-03-16 RX ADMIN — SODIUM CHLORIDE 100 MILLILITER(S): 9 INJECTION INTRAMUSCULAR; INTRAVENOUS; SUBCUTANEOUS at 10:09

## 2021-03-16 RX ADMIN — Medication 100 MILLIEQUIVALENT(S): at 10:04

## 2021-03-16 RX ADMIN — Medication 12.5 MILLIGRAM(S): at 18:56

## 2021-03-16 NOTE — PROGRESS NOTE ADULT - SUBJECTIVE AND OBJECTIVE BOX
69 y/o female with a PMHx of Crohns presents to the ED for evaluation. Pt reports having cough, n/v/d and feeling fatigued over the last few days. Pt fearful of being admitted and doesn't have a PCP; her  tells me he made her come, due to the fact that she was v weak, not eating and he was concerned.  Pt has new onset AF was RVR when she arrived and I believe this was the main reason her her feeling weak, she needs a CT abd to r/o IBD flare, doubt any ischemic colitis ( no blood with the diarrhea, but in view of the new AF worth ruling out).   Case d/w ; pt hasn't been on any meds for years, doesn't have a GI I can call.  Pt tells me she stopped eating weeks ago. She is clearly dehydrated    (15 Mar 2021 14:35)    admitted for nausea, vomiting, weakness  concerning for Crohn's exacerbation v. ischemic colitis.  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  Will likely need colonoscopy.  Consulted for newly diagnosed afib on ECG on admit.  Pt has mild RVR in 110s w/ afib may be due to acute illness.  Reports fatigue but no dyspnea, chest pain,.  Some palpitations intermittent lasting minutes every few days on questioning.  No chest pain.    3/16/'21: no complaints. discussed CT scan findings.  colonscopy rescheduled tommorrow K low today.  discussed CV pt reluctant to do this.      MEDICATIONS:  OUTPATIENT  Home Medications:  Refresh ophthalmic solution: 1 drop(s) to each affected eye once a day, As Needed - for dry eyes (15 Mar 2021 14:38)      INPATIENT  MEDICATIONS  (STANDING):  metoprolol tartrate 12.5 milliGRAM(s) Oral two times a day  potassium chloride    Tablet ER 40 milliEquivalent(s) Oral once  potassium chloride    Tablet ER 40 milliEquivalent(s) Oral every 4 hours  sodium chloride 0.9%. 1000 milliLiter(s) (100 mL/Hr) IV Continuous <Continuous>    MEDICATIONS  (PRN):  aluminum hydroxide/magnesium hydroxide/simethicone Suspension 30 milliLiter(s) Oral every 4 hours PRN Dyspepsia  ondansetron Injectable 4 milliGRAM(s) IV Push every 6 hours PRN Nausea      Vital Signs Last 24 Hrs  T(C): 36.8 (16 Mar 2021 08:00), Max: 37.1 (15 Mar 2021 16:12)  T(F): 98.2 (16 Mar 2021 08:00), Max: 98.7 (15 Mar 2021 16:12)  HR: 110 (16 Mar 2021 08:00) (97 - 111)  BP: 121/77 (16 Mar 2021 08:00) (97/56 - 134/73)  BP(mean): 84 (15 Mar 2021 16:12) (84 - 84)  RR: 18 (16 Mar 2021 08:00) (16 - 18)  SpO2: 96% (16 Mar 2021 08:00) (92% - 98%)Daily     Daily I&O's Summary    15 Mar 2021 07:01  -  16 Mar 2021 07:00  --------------------------------------------------------  IN: 950 mL / OUT: 0 mL / NET: 950 mL    16 Mar 2021 07:01  -  16 Mar 2021 16:05  --------------------------------------------------------  IN: 300 mL / OUT: 0 mL / NET: 300 mL        I&O's Detail    15 Mar 2021 07:01  -  16 Mar 2021 07:00  --------------------------------------------------------  IN:    sodium chloride 0.9%: 950 mL  Total IN: 950 mL    OUT:  Total OUT: 0 mL    Total NET: 950 mL      16 Mar 2021 07:01  -  16 Mar 2021 16:05  --------------------------------------------------------  IN:    IV PiggyBack: 300 mL  Total IN: 300 mL    OUT:  Total OUT: 0 mL    Total NET: 300 mL          I&O's Summary    15 Mar 2021 07:01  -  16 Mar 2021 07:00  --------------------------------------------------------  IN: 950 mL / OUT: 0 mL / NET: 950 mL    16 Mar 2021 07:01  -  16 Mar 2021 16:05  --------------------------------------------------------  IN: 300 mL / OUT: 0 mL / NET: 300 mL        PHYSICAL EXAM:    Constitutional: NAD, awake and alert,   HEENT: PERR, EOMI,  No oral cyananosis.  Neck:  supple,  No JVD  Respiratory: Breath sounds are clear bilaterally, No wheezing, rales or rhonchi  Cardiovascular: S1 and S2, iregular rate and rhythm, no Murmurs, gallops or rubs  Gastrointestinal: Bowel Sounds present, soft, nontender.   Extremities: No peripheral edema. No clubbing or cyanosis.  Vascular: 2+ peripheral pulses  Neurological: A/O x 3, no focal deficits      LABS: All Labs Reviewed:                        12.4   12.04 )-----------( 322      ( 16 Mar 2021 06:32 )             37.4                         14.5   17.91 )-----------( 333      ( 15 Mar 2021 12:48 )             43.1     16 Mar 2021 14:28    140    |  108    |  15     ----------------------------<  122    3.1     |  24     |  0.76   16 Mar 2021 06:32    143    |  109    |  16     ----------------------------<  103    2.8     |  25     |  0.92   15 Mar 2021 12:48    128    |  93     |  14     ----------------------------<  143    3.8     |  22     |  1.68     Ca    7.9        16 Mar 2021 14:28  Ca    8.4        16 Mar 2021 06:32  Ca    8.7        15 Mar 2021 12:48    TPro  8.0    /  Alb  2.4    /  TBili  1.2    /  DBili  x      /  AST  83     /  ALT  45     /  AlkPhos  94     15 Mar 2021 12:48    PT/INR - ( 15 Mar 2021 12:48 )   PT: 18.6 sec;   INR: 1.63 ratio         PTT - ( 15 Mar 2021 12:48 )  PTT:29.2 sec      Blood Culture:     03-15 @ 12:48  TSH: 2.66    ===============================  EKG:  < from: 12 Lead ECG (03.15.21 @ 12:36) >  Diagnosis Line Atrial fibrillation with rapid ventricular response  ST & T wave abnormality, consider inferior ischemia  Abnormal ECG  When compared with ECG of 15-MAR-2021 12:35,  Nonspecific T wave abnormality has replaced inverted T waves in Lateral leads  Confirmed by MD CASPER, YOLY ([id number]) on 3/15/2021 3:50:56 PM    < end of copied text >  ===============================  RADIOLOGY:  < from: Xray Chest 1 View- PORTABLE-Urgent (Xray Chest 1 View- PORTABLE-Urgent .) (03.15.21 @ 13:32) >  Comparison: 03/15/2021    AP radiograph of the chest demonstrates no evidence of infiltrate, pleural effusion or vascular congestion. No atelectasis is seen. The cardiac silhouette is mildly enlarged in size. Osseous structures are intact.    Impression: No active pulmonary disease. Mild cardiomegaly.    JANIS COSTA MD; AttendingRadiologist  This document has been electronically signed. Mar 15 2021  4:57PM    < end of copied text >    < from: CT Abdomen and Pelvis w/ IV Cont (03.15.21 @ 14:27) >    IMPRESSION:  Soft tissue mass centered around the right adnexa appears to be emanating from the appendix/cecum and secondarily involving the right adnexa concerning for neoplasm which is likely appendiceal/cecal in origin versus right ovarian. Consideration for colonoscopy for further evaluation.    Indeterminate hepatic lesion for which MRI is recommendedfor further evaluation. Right paracolic gutter peritoneal nodularity for which carcinomatosis cannot be excluded.    ===============================    Findings    Mitral Valve  Fibrocalcific changes noted to the mitral valve leaflets with preserved  leaflet excursion. Mitral annular calcification.    Aortic Valve  Fibrocalcific changes noted to the Aortic valve leaflets with preserved  leaflet excursion. Trace aortic regurgitation.    Tricuspid Valve  Normal appearing tricuspid valve structure and function.  Trace tricuspid valve regurgitation.    Pulmonic Valve  Pulmonic valve not well seen.    Left Atrium  The left atrium is moderately dilated.    Left Ventricle  Endocardium is not well visualized, however, overall left ventricular  systolic function appears normal. Estimated left ventricular ejection  fraction is 55%.    Right Atrium  Normal appearing right atrium.    Right Ventricle  Normal appearing right ventricle structure and function.  A pacemaker lead is seen in RA and RV.    Miscellaneous  The IVC appears normal.    Impression    Summary    Endocardium is not well visualized, however, overall left ventricular  systolic function appears normal. Estimated left ventricular ejection  fraction is 55%.  The left atrium is moderately dilated.    Signature    ----------------------------------------------------------------  Electronically signed by Christo Gaming MD(Interpreting  physician) on 03/16/2021 01:02 PM  ----------------------------------------------------------------

## 2021-03-16 NOTE — PROGRESS NOTE ADULT - SUBJECTIVE AND OBJECTIVE BOX
71 y/o female with a PMHx of Crohns presents to the ED for evaluation. Pt reports having cough, n/v/d and feeling fatigued over the last few days. Pt fearful of being admitted and doesn't have a PCP; her  tells me he made her come, due to the fact that she was v weak, not eating and he was concerned.  Pt has new onset AF was RVR when she arrived and I believe this was the main reason her her feeling weak, she needs a CT abd to r/o IBD flare, doubt any ischemic colitis ( no blood with the diarrhea, but in view of the new AF worth ruling out). Case d/w ; pt hasn't been on any meds for years, doesn't have a GI I can call.  Pt tells me she stopped eating weeks ago.    3/16: Patient denies any complaints. Potassium is critically low and will need to be replenished prior to EGD with anesthesia.     REVIEW OF SYSTEMS:  All other review of systems is negative unless indicated above    PHYSICAL EXAM:  Constitutional: NAD, awake and alert, well-developed  HEENT: PERR, EOMI, Normal Hearing, MMM  Neck: Soft and supple, No LAD, No JVD  Respiratory: Breath sounds are clear bilaterally, No wheezing, rales or rhonchi  Cardiovascular: S1 and S2, regular rate and rhythm, no Murmurs, gallops or rubs  Gastrointestinal: Bowel Sounds present, soft, nontender, nondistended, no guarding, no rebound  Extremities: No peripheral edema  Vascular: 2+ peripheral pulses  Neurological: A/O x 3, no focal deficits  Musculoskeletal: 5/5 strength b/l upper and lower extremities  Skin: No rashes      Vital Signs Last 24 Hrs  T(C): 36.8 (16 Mar 2021 08:00), Max: 37.1 (15 Mar 2021 16:12)  T(F): 98.2 (16 Mar 2021 08:00), Max: 98.7 (15 Mar 2021 16:12)  HR: 110 (16 Mar 2021 08:00) (97 - 124)  BP: 121/77 (16 Mar 2021 08:00) (94/66 - 134/73)  BP(mean): 84 (15 Mar 2021 16:12) (70 - 84)  RR: 18 (16 Mar 2021 08:00) (15 - 29)  SpO2: 96% (16 Mar 2021 08:00) (92% - 98%)     MEDICATIONS  (STANDING):  potassium chloride    Tablet ER 40 milliEquivalent(s) Oral every 4 hours  potassium chloride  10 mEq/100 mL IVPB 10 milliEquivalent(s) IV Intermittent every 1 hour  sodium chloride 0.9%. 1000 milliLiter(s) (100 mL/Hr) IV Continuous <Continuous>    MEDICATIONS  (PRN):  aluminum hydroxide/magnesium hydroxide/simethicone Suspension 30 milliLiter(s) Oral every 4 hours PRN Dyspepsia  ondansetron Injectable 4 milliGRAM(s) IV Push every 6 hours PRN Nausea        LABS: All Labs Reviewed:                        12.4   12.04 )-----------( 322      ( 16 Mar 2021 06:32 )             37.4     03-16    143  |  109<H>  |  16  ----------------------------<  103<H>  2.8<LL>   |  25  |  0.92    Ca    8.4<L>      16 Mar 2021 06:32    TPro  8.0  /  Alb  2.4<L>  /  TBili  1.2  /  DBili  x   /  AST  83<H>  /  ALT  45  /  AlkPhos  94  03-15    PT/INR - ( 15 Mar 2021 12:48 )   PT: 18.6 sec;   INR: 1.63 ratio         PTT - ( 15 Mar 2021 12:48 )  PTT:29.2 sec      Blood Culture:     ABG:   I/O's: I&O's Summary    15 Mar 2021 07:01  -  16 Mar 2021 07:00  --------------------------------------------------------  IN: 950 mL / OUT: 0 mL / NET: 950 mL    16 Mar 2021 07:01  -  16 Mar 2021 13:03  --------------------------------------------------------  IN: 100 mL / OUT: 0 mL / NET: 100 mL      RADIOLOGY:  Xray Chest 1 View- PORTABLE-Urgent (Xray Chest 1 View- PORTABLE-Urgent .) (03.15.21 @ 13:32) >  Impression: No active pulmonary disease. Mild cardiomegaly.    CT Abdomen and Pelvis w/ IV Cont (03.15.21 @ 14:27) >  EXAM:  CT ABDOMEN AND PELVIS IC                        PROCEDURE DATE:  03/15/2021    INTERPRETATION:  CLINICAL INFORMATION: Crohn's with diarrhea  COMPARISON: None.  CONTRAST/COMPLICATIONS:  IV Contrast: Omnipaque 350 / / .  Oral Contrast: None  Complications: None    PROCEDURE:  CT of the Abdomen and Pelvis was performed.  Sagittal and coronal reformats were performed.    FINDINGS:  LOWER CHEST: Within normal limits.    LIVER: Hypovascular lesion in the medial right hepatic lobeextending into the caudate lobe indeterminate in nature however given possible cecal/appendiceal malignancy, metastasis cannot be excluded and MRI is recommended for further evaluation.  BILE DUCTS: Normal caliber.  GALLBLADDER: Peripheral calcification of the gallbladder wall particularly in the fundus consistent with a porcelain gallbladder.  SPLEEN: Subcentimeter hypodensity in the anterior spleen indeterminate.  PANCREAS: Within normal limits.  ADRENALS: Within normal limits.  KIDNEYS/URETERS: Kidneys enhance bilaterally and symmetrically without hydronephrosis renal mass or renal calculus. The ureters are unremarkable.  BLADDER: Within normal limits.  BOWEL, PERITONEUM AND REPRODUCTIVE ORGANS: Heterogeneous masslike lesion which appears to be involving the cecum/appendix and intimately associated with the right adnexa with adjacent infiltration of the fat concerning for a cecal versus appendiceal carcinoma with secondary involvement of the right adnexa versus right ovarian neoplasm. Scattered colonic diverticulosis. No bowel obstruction. Soft tissue nodular density within the right paracolic gutter fat, series 2 image 75, indeterminate in nature measuring 1 cm for which a peritoneal implant cannot be excluded.  VESSELS: Atherosclerotic changes.  RETROPERITONEUM/LYMPH NODES: No lymphadenopathy.  ABDOMINAL WALL: Within normal limits.  BONES: Degenerative changes.    IMPRESSION:  Soft tissue mass centered around the right adnexa appears to be emanating from the appendix/cecum and secondarily involving the right adnexa concerning for neoplasm which is likely appendiceal/cecal in origin versus right ovarian. Consideration for colonoscopy for further evaluation.  Indeterminate hepatic lesion for which MRI is recommendedfor further evaluation. Right paracolic gutter peritoneal nodularity for which carcinomatosis cannot be excluded.      ECHOCARDIOGRAM:  TTE Echo Complete w/o Contrast w/ Doppler (03.16.21 @ 10:34) >   Impression   Summary   Endocardium is not well visualized, however, overall left ventricular   systolic function appearsnormal. Estimated left ventricular ejection   fraction is 55%.   The left atrium is moderately dilated.    TELEMETRY:   12 Lead ECG (03.15.21 @ 12:36) >  Ventricular Rate 126 BPM  Atrial Rate 136 BPM  QRS Duration 74 ms  Q-T Interval 310 ms  QTC Calculation(Bazett) 448 ms  R Axis 48 degrees  T Axis 6 degrees  Diagnosis Line Atrial fibrillation with rapid ventricular response  ST & T wave abnormality, consider inferior ischemia  Abnormal ECG  When compared with ECG of 15-MAR-2021 12:35,  Nonspecific T wave abnormality has replaced inverted T waves in Lateral leads

## 2021-03-16 NOTE — PROGRESS NOTE ADULT - ASSESSMENT
71 y/o female with a PMHx of Crohns presents with cough, n/v/d and feeling fatigued over the last few days. New onset afib noted in ED. Patient has no PCP or recent medical care.    # Weakness, nausea, diarrhea, abdominal discomfort not pain  -r/o Crohn's exacerbation vs ischemic colitis  -CT abd PO contrast only-   -S/P zosyn x1   -IVF, rate at 80 in case there is CHF component    # New onset AF, vs chronic undiagnosed patient doesn't have a PCP; intermittent RVR  -suspect dehydration to contribute to it  -start low dose BB if rate persistently high  -TTE- EF 55%. The left atrium is moderately dilated.  -cardio consulted  -decide about the AC if HH stable    #HILARY, hyponatremia sec to dehydration  NS@80    #GOC:   - Code status not addressed pt is too anxious for me to start discussion at this time        ASSESSMENT AND PLAN:    #Diet, NPO after Midnight:      NPO Start Date: 16-Mar-2021,   NPO Start Time: 23:59 (03-16-21 @ 11:01) [Active]  Diet, Clear Liquid (03-15-21 @ 17:34) [Active]          DISPO:    Family Communication: 71 y/o female with a PMHx of Crohns presents with cough, n/v/d and feeling fatigued over the last few days. New onset afib noted in ED. Patient has no PCP or recent medical care.    # Weakness, nausea, diarrhea, abdominal discomfort not pain  -r/o Crohn's exacerbation vs ischemic colitis  -S/P zosyn x1   -IVF, rate at 80 in case there is CHF component  -CT abd PO contrast only: Soft tissue mass centered around the right adnexa appears to be emanating from the appendix/cecum and secondarily involving the right adnexa concerning for neoplasm which is likely appendiceal/cecal in origin versus right ovarian.     # New onset AF, vs chronic undiagnosed patient doesn't have a PCP; intermittent RVR  -suspect dehydration to contribute to it  -start low dose BB if rate persistently high  -TTE- EF 55%. The left atrium is moderately dilated.  -cardio consulted; Hold BB for now given acute illness   -decide about the AC if HH stable; will hold until EGD tomorrow     #HILARY, hyponatremia sec to dehydration  -NS@80  -Resolved     #Diet, NPO after Midnight:      NPO Start Date: 16-Mar-2021,   NPO Start Time: 23:59 (03-16-21 @ 11:01) [Active]  Diet, Clear Liquid (03-15-21 @ 17:34) [Active]    #GOC:   - Code status not addressed pt is too anxious for me to start discussion at this time    DISPO: Pending further work up for possible neoplasm     Family Communication: Spouse Fiona Ng [phone] updated daily

## 2021-03-17 ENCOUNTER — RESULT REVIEW (OUTPATIENT)
Age: 71
End: 2021-03-17

## 2021-03-17 DIAGNOSIS — R93.89 ABNORMAL FINDINGS ON DIAGNOSTIC IMAGING OF OTHER SPECIFIED BODY STRUCTURES: ICD-10-CM

## 2021-03-17 DIAGNOSIS — I48.91 UNSPECIFIED ATRIAL FIBRILLATION: ICD-10-CM

## 2021-03-17 LAB
ALBUMIN SERPL ELPH-MCNC: 1.9 G/DL — LOW (ref 3.3–5)
ALP SERPL-CCNC: 69 U/L — SIGNIFICANT CHANGE UP (ref 40–120)
ALT FLD-CCNC: 52 U/L — SIGNIFICANT CHANGE UP (ref 12–78)
ANION GAP SERPL CALC-SCNC: 8 MMOL/L — SIGNIFICANT CHANGE UP (ref 5–17)
AST SERPL-CCNC: 86 U/L — HIGH (ref 15–37)
BILIRUB SERPL-MCNC: 0.5 MG/DL — SIGNIFICANT CHANGE UP (ref 0.2–1.2)
BUN SERPL-MCNC: 10 MG/DL — SIGNIFICANT CHANGE UP (ref 7–23)
CALCIUM SERPL-MCNC: 8.2 MG/DL — LOW (ref 8.5–10.1)
CHLORIDE SERPL-SCNC: 113 MMOL/L — HIGH (ref 96–108)
CO2 SERPL-SCNC: 22 MMOL/L — SIGNIFICANT CHANGE UP (ref 22–31)
CREAT SERPL-MCNC: 0.62 MG/DL — SIGNIFICANT CHANGE UP (ref 0.5–1.3)
GLUCOSE SERPL-MCNC: 84 MG/DL — SIGNIFICANT CHANGE UP (ref 70–99)
HCT VFR BLD CALC: 36.1 % — SIGNIFICANT CHANGE UP (ref 34.5–45)
HGB BLD-MCNC: 12 G/DL — SIGNIFICANT CHANGE UP (ref 11.5–15.5)
MCHC RBC-ENTMCNC: 30.4 PG — SIGNIFICANT CHANGE UP (ref 27–34)
MCHC RBC-ENTMCNC: 33.2 GM/DL — SIGNIFICANT CHANGE UP (ref 32–36)
MCV RBC AUTO: 91.4 FL — SIGNIFICANT CHANGE UP (ref 80–100)
PLATELET # BLD AUTO: 348 K/UL — SIGNIFICANT CHANGE UP (ref 150–400)
POTASSIUM SERPL-MCNC: 4.1 MMOL/L — SIGNIFICANT CHANGE UP (ref 3.5–5.3)
POTASSIUM SERPL-SCNC: 4.1 MMOL/L — SIGNIFICANT CHANGE UP (ref 3.5–5.3)
PROT SERPL-MCNC: 6.3 GM/DL — SIGNIFICANT CHANGE UP (ref 6–8.3)
RBC # BLD: 3.95 M/UL — SIGNIFICANT CHANGE UP (ref 3.8–5.2)
RBC # FLD: 13.5 % — SIGNIFICANT CHANGE UP (ref 10.3–14.5)
SODIUM SERPL-SCNC: 143 MMOL/L — SIGNIFICANT CHANGE UP (ref 135–145)
WBC # BLD: 6.98 K/UL — SIGNIFICANT CHANGE UP (ref 3.8–10.5)
WBC # FLD AUTO: 6.98 K/UL — SIGNIFICANT CHANGE UP (ref 3.8–10.5)

## 2021-03-17 PROCEDURE — 99232 SBSQ HOSP IP/OBS MODERATE 35: CPT

## 2021-03-17 PROCEDURE — 88305 TISSUE EXAM BY PATHOLOGIST: CPT | Mod: 26

## 2021-03-17 PROCEDURE — 99233 SBSQ HOSP IP/OBS HIGH 50: CPT

## 2021-03-17 PROCEDURE — 99221 1ST HOSP IP/OBS SF/LOW 40: CPT

## 2021-03-17 RX ORDER — METOPROLOL TARTRATE 50 MG
12.5 TABLET ORAL EVERY 8 HOURS
Refills: 0 | Status: DISCONTINUED | OUTPATIENT
Start: 2021-03-17 | End: 2021-03-18

## 2021-03-17 RX ORDER — MULTIVIT WITH MIN/MFOLATE/K2 340-15/3 G
1 POWDER (GRAM) ORAL ONCE
Refills: 0 | Status: COMPLETED | OUTPATIENT
Start: 2021-03-17 | End: 2021-03-17

## 2021-03-17 RX ADMIN — Medication 12.5 MILLIGRAM(S): at 22:35

## 2021-03-17 RX ADMIN — Medication 12.5 MILLIGRAM(S): at 13:07

## 2021-03-17 RX ADMIN — Medication 1 BOTTLE: at 08:41

## 2021-03-17 NOTE — PROGRESS NOTE ADULT - SUBJECTIVE AND OBJECTIVE BOX
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

## 2021-03-17 NOTE — PROGRESS NOTE ADULT - ASSESSMENT
69 y/o female with a PMHx of Crohns presents with cough, n/v/d and feeling fatigued over the last few days. New onset afib noted in ED. Patient has no PCP or recent medical care.    # Weakness, nausea, diarrhea, abdominal discomfort not pain  -r/o Crohn's exacerbation vs ischemic colitis  -S/P zosyn x1   -IVF, rate at 80 in case there is CHF component  -CT abd PO contrast only:     Soft tissue mass centered around the right adnexa appears to be emanating from the appendix/cecum and secondarily involving the right adnexa concerning for neoplasm which is likely appendiceal/cecal in origin versus right ovarian.     # New onset AF, vs chronic undiagnosed   Patient doesn't have a PCP;  intermittent RVR  -suspect dehydration to contribute to it  -start low dose BB if rate persistently high  -TTE- EF 55%. The left atrium is moderately dilated.  -cardio consulted; Hold BB for now given acute illness   -decide about the AC if HH stable; will hold until EGD tomorrow     #HILAYR, hyponatremia sec to dehydration  -NS@80  -Resolved     #Diet, NPO after Midnight:      NPO Start Date:     #GOC:   - Code status not addressed pt is too anxious for me to start discussion at this time    DISPO: Pending further work up for possible neoplasm     Family Communication: Spouse Fiona Ng [phone] updated daily  69 y/o female with a PMHx of Crohns presents with cough, n/v/d and feeling fatigued over the last few days. New onset afib noted in ED. Patient has no PCP or recent medical care.    # Weakness, nausea, diarrhea,   abdominal discomfort not pain  -r/o Crohn's exacerbation vs ischemic colitis  -S/P zosyn x1   -IVF, rate at 80 in case there is CHF component  -CT abd PO contrast only:   Soft tissue mass centered around the right adnexa appears to be emanating from the appendix/cecum and secondarily involving the right adnexa concerning for neoplasm which is likely appendiceal/cecal in origin versus right ovarian.   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 The left atrium is moderately dilated.  -cardio consulted;   HR better controlled       #HILARY, hyponatremia sec to dehydration  -s/p NS@80  -Resolved     #Diet, NPO for procedure     #GOC:   - Code status not addressed pt is too anxious for me to start discussion at this time    DISPO: Pending further work up for possible neoplasm     Family Communication: Spouse Less Hernandez [phone] updated daily

## 2021-03-17 NOTE — CONSULT NOTE ADULT - SUBJECTIVE AND OBJECTIVE BOX
PAST MEDICAL & SURGICAL HISTORY:  Crohn&#x27;s disease    Vital Signs Last 24 Hrs  T(C): 36.5 (17 Mar 2021 17:12), Max: 36.8 (16 Mar 2021 21:10)  T(F): 97.7 (17 Mar 2021 17:12), Max: 98.2 (16 Mar 2021 21:10)  HR: 115 (17 Mar 2021 17:12) (104 - 118)  BP: 115/65 (17 Mar 2021 17:12) (113/90 - 127/78)  BP(mean): --  RR: 18 (17 Mar 2021 08:14) (18 - 20)  SpO2: 95% (17 Mar 2021 17:12) (93% - 95%)    Physical Exam:  General: AAOx3, in NAD  HEENT: NC/AT, EOMI      Labs:                        12.0   6.98  )-----------( 348      ( 17 Mar 2021 06:37 )             36.1   03-17    143  |  113<H>  |  10  ----------------------------<  84  4.1   |  22  |  0.62    Ca    8.2<L>      17 Mar 2021 06:37    TPro  6.3  /  Alb  1.9<L>  /  TBili  0.5  /  DBili  x   /  AST  86<H>  /  ALT  52  /  AlkPhos  69  03-17    CEA 3.6   46    Imaging:  < from: CT Abdomen and Pelvis w/ IV Cont (03.15.21 @ 14:27) >    EXAM:  CT ABDOMEN AND PELVIS IC                            PROCEDURE DATE:  03/15/2021      < end of copied text >  < from: CT Abdomen and Pelvis w/ IV Cont (03.15.21 @ 14:27) >  IMPRESSION:  Soft tissue mass centered around the right adnexa appears to be emanating from the appendix/cecum and secondarily involving the right adnexa concerning for neoplasm which is likely appendiceal/cecal in origin versus right ovarian. Consideration for colonoscopy for further evaluation.    Indeterminate hepatic lesion for which MRI is recommendedfor further evaluation. Right paracolic gutter peritoneal nodularity for which carcinomatosis cannot be excluded.    < end of copied text >   Pt is a 71 yo F who was admitted to  for weakness, fatigue and decreased PO intake for the past 2 weeks associated with nausea, chills and night sweats. Pt reports weight loss during these past two weeks but no weight changes prior. Pt reports having bowel movements. Denies abdominal pain. States she last had a colonoscopy in 2009 with Dr. Barragan and was diagnosed with Crohn's. Was placed on medication and medication was stopped after a flare in 2015. Has not been on any medication since. Of note, pt was found to be in Afib with RVR in the ED.    PAST MEDICAL & SURGICAL HISTORY:  Crohn&#x27;s disease    Vital Signs Last 24 Hrs  T(C): 36.5 (17 Mar 2021 17:12), Max: 36.8 (16 Mar 2021 21:10)  T(F): 97.7 (17 Mar 2021 17:12), Max: 98.2 (16 Mar 2021 21:10)  HR: 115 (17 Mar 2021 17:12) (104 - 118)  BP: 115/65 (17 Mar 2021 17:12) (113/90 - 127/78)  BP(mean): --  RR: 18 (17 Mar 2021 08:14) (18 - 20)  SpO2: 95% (17 Mar 2021 17:12) (93% - 95%)    Physical Exam:  General: AAOx3, in NAD  HEENT: NC/AT, EOMI  Cardio: irregularly irregular  Pulm: equal air entry b/l, non labored  Abdomen: soft, obese, NT/ND      Labs:                        12.0   6.98  )-----------( 348      ( 17 Mar 2021 06:37 )             36.1   03-17    143  |  113<H>  |  10  ----------------------------<  84  4.1   |  22  |  0.62    Ca    8.2<L>      17 Mar 2021 06:37    TPro  6.3  /  Alb  1.9<L>  /  TBili  0.5  /  DBili  x   /  AST  86<H>  /  ALT  52  /  AlkPhos  69  03-17    CEA 3.6   46    Imaging:  < from: CT Abdomen and Pelvis w/ IV Cont (03.15.21 @ 14:27) >    EXAM:  CT ABDOMEN AND PELVIS IC                            PROCEDURE DATE:  03/15/2021      < end of copied text >  < from: CT Abdomen and Pelvis w/ IV Cont (03.15.21 @ 14:27) >  IMPRESSION:  Soft tissue mass centered around the right adnexa appears to be emanating from the appendix/cecum and secondarily involving the right adnexa concerning for neoplasm which is likely appendiceal/cecal in origin versus right ovarian. Consideration for colonoscopy for further evaluation.    Indeterminate hepatic lesion for which MRI is recommendedfor further evaluation. Right paracolic gutter peritoneal nodularity for which carcinomatosis cannot be excluded.    < end of copied text >

## 2021-03-17 NOTE — CONSULT NOTE ADULT - ATTENDING COMMENTS
Storm Valdez M.D.  Cardiology, [facility]  Cell: [phone]  Office:   [phone] ([facility] Office)  [phone] ([facility] Office)
Seen and examined.  Admitted for poor appetite, dehydration, weakness with new onset afib.   Does not see doctors regularly.  Admits to some nausea though no significant emesis. No abdominal pain, black stools or rectal bleeding.  h/o Crohns but not on active meds. Last scope with Fried 12 yrs prior.  CT with findings of cecal/appendiceal mass with impingement on right adenxa vs right adenxal mass with impingement on cecum, also with suspicious hepatic lesion in segment 8.   CEA normal.  mildly elevated. Denies abnormal uterine bleeding or pelvic pain. No prior surgeries.  Colonoscopy performed with findings of ulcerating appendiceal lesion, biopsies pending.  AFVSS  NAD  soft, NTND  A/P - Questionable appendiceal vs adenxal lesion with ? peritoneal/hepatic mets    MRI as recommended.   Await biopsy results.  Gyn onc consulted and surg onc aware.  Discussed with pt above findings. Surgical planning in progress.  Colonoscopy

## 2021-03-17 NOTE — PROGRESS NOTE ADULT - SUBJECTIVE AND OBJECTIVE BOX
REASON FOR VISIT:  AF    HPI:  69 y/o woman with a history of Crohns Disease and lack of regular medical attention admitted on 3/15/21 with new onset AF with possible Crohns' exacerbation vs ischemic colitis and with abdominal CT revealing a soft tissue mass around the right adnexa.    3/16/'21: no complaints. discussed CT scan findings. colonoscopy rescheduled tomorrow K low today. discussed CV pt reluctant to do this.  3/17/21:  No new complaints; occasional palpitations.    MEDICATIONS  (STANDING):  magnesium citrate Oral Solution 1 Bottle Oral once  metoprolol tartrate 12.5 milliGRAM(s) Oral two times a day  sodium chloride 0.9%. 1000 milliLiter(s) (100 mL/Hr) IV Continuous <Continuous>    Vital Signs Last 24 Hrs  T(C): 36.4 (17 Mar 2021 08:14), Max: 36.8 (16 Mar 2021 21:10)  T(F): 97.5 (17 Mar 2021 08:14), Max: 98.2 (16 Mar 2021 21:10)  HR: 104 (17 Mar 2021 08:14) (104 - 120)  BP: 121/76 (17 Mar 2021 08:14) (107/60 - 127/78)  RR: 18 (17 Mar 2021 08:14) (18 - 20)  SpO2: 94% (17 Mar 2021 08:14) (92% - 94%)    PHYSICAL EXAM:  Constitutional: NAD, awake and alert,   Respiratory: Breath sounds are clear bilaterally  Cardiovascular: Irregular, mild tachycardia  Gastrointestinal: Bowel Sounds present, abdomen is soft, nontender.   Extremities: No peripheral edema.    LABS:                     12.0   6.98  )-----------( 348      ( 17 Mar 2021 06:37 )             36.1     143  |  113<H>  |  10  ----------------------------<  84  4.1   |  22  |  0.62    Ca    8.2<L>      17 Mar 2021 06:37    TPro  6.3  /  Alb  1.9<L>  /  TBili  0.5  /  DBili  x   /  AST  86<H>  /  ALT  52  /  AlkPhos  69  03-17    12 Lead ECG (03.15.21 @ 12:36):  Atrial fibrillation with rapid ventricular response  ST & T wave abnormality, consider inferior ischemia    CT Abdomen and Pelvis w/ IV Cont (03.15.21 @ 14:27):  Soft tissue mass centered around the right adnexa appears to be emanating from the appendix/cecum and secondarily involving the right adnexa concerning for neoplasm which is likely appendiceal/cecal in origin versus right ovarian. Consideration for colonoscopy for further evaluation. Indeterminate hepatic lesion for which MRI is recommended for further evaluation. Right paracolic gutter peritoneal nodularity for which carcinomatosis cannot be excluded.    TTE Echo Complete w/o Contrast w/ Doppler (03.16.21 @ 10:34):   Endocardium is not well visualized, however, overall left ventricular systolic function appears normal. Estimated left ventricular ejection fraction is 55%.   The left atrium is moderately dilated.    Tele:  AF

## 2021-03-17 NOTE — PROGRESS NOTE ADULT - SUBJECTIVE AND OBJECTIVE BOX
71 y/o female with a PMHx of Crohns presents to the ED for evaluation. Pt reports having cough, n/v/d and feeling fatigued over the last few days. Pt fearful of being admitted and doesn't have a PCP; her  tells me he made her come, due to the fact that she was v weak, not eating and he was concerned.  Pt has new onset AF was RVR when she arrived and I believe this was the main reason her her feeling weak, she needs a CT abd to r/o IBD flare, doubt any ischemic colitis ( no blood with the diarrhea, but in view of the new AF worth ruling out). Case d/w ; pt hasn't been on any meds for years, doesn't have a GI I can call.  Pt tells me she stopped eating weeks ago.    3/16: Patient denies any complaints. Potassium is critically low and will need to be replenished prior to EGD with anesthesia.        REVIEW OF SYSTEMS:  All other review of systems is negative unless indicated above      Vital Signs Last 24 Hrs  T(C): 36.4 (17 Mar 2021 08:14), Max: 36.8 (16 Mar 2021 21:10)  T(F): 97.5 (17 Mar 2021 08:14), Max: 98.2 (16 Mar 2021 21:10)  HR: 104 (17 Mar 2021 08:14) (104 - 120)  BP: 121/76 (17 Mar 2021 08:14) (107/60 - 127/78)  BP(mean): --  RR: 18 (17 Mar 2021 08:14) (18 - 20)  SpO2: 94% (17 Mar 2021 08:14) (92% - 94%)  PHYSICAL EXAM:  Constitutional: NAD, awake and alert, well-developed  HEENT: PERR, EOMI, Normal Hearing, MMM  Neck: Soft and supple, No LAD, No JVD  Respiratory: Breath sounds are clear bilaterally, No wheezing, rales or rhonchi  Cardiovascular: S1 and S2, regular rate and rhythm, no Murmurs, gallops or rubs  Gastrointestinal: Bowel Sounds present, soft, nontender, nondistended, no guarding, no rebound  Extremities: No peripheral edema  Vascular: 2+ peripheral pulses  Neurological: A/O x 3, no focal deficits  Musculoskeletal: 5/5 strength b/l upper and lower extremities  Skin: No rashes        143  |  113<H>  |  10  ----------------------------<  84  4.1   |  22  |  0.62    Ca    8.2<L>      17 Mar 2021 06:37    TPro  6.3  /  Alb  1.9<L>  /  TBili  0.5  /  DBili  x   /  AST  86<H>  /  ALT  52  /  AlkPhos  69                              12.0   6.98  )-----------( 348      ( 17 Mar 2021 06:37 )             36.1             LIVER FUNCTIONS - ( 17 Mar 2021 06:37 )  Alb: 1.9 g/dL / Pro: 6.3 gm/dL / ALK PHOS: 69 U/L / ALT: 52 U/L / AST: 86 U/L / GGT: x                 Urinalysis Basic - ( 15 Mar 2021 15:08 )    Color: Yellow / Appearance: Clear / S.005 / pH: x  Gluc: x / Ketone: Negative  / Bili: Negative / Urobili: Negative mg/dL   Blood: x / Protein: Negative mg/dL / Nitrite: Negative   Leuk Esterase: Negative / RBC: x / WBC x   Sq Epi: x / Non Sq Epi: x / Bacteria: x        RADIOLOGY:  Xray Chest 1 View- PORTABLE-Urgent (Xray Chest 1 View- PORTABLE-Urgent .) (03.15.21 @ 13:32) >  Impression: No active pulmonary disease. Mild cardiomegaly.    CT Abdomen and Pelvis w/ IV Cont (03.15.21 @ 14:27) >  EXAM:  CT ABDOMEN AND PELVIS IC                        PROCEDURE DATE:  03/15/2021    INTERPRETATION:  CLINICAL INFORMATION: Crohn's with diarrhea  COMPARISON: None.  CONTRAST/COMPLICATIONS:  IV Contrast: Omnipaque 350 / / .  Oral Contrast: None  Complications: None    PROCEDURE:  CT of the Abdomen and Pelvis was performed.  Sagittal and coronal reformats were performed.    FINDINGS:  LOWER CHEST: Within normal limits.    LIVER: Hypovascular lesion in the medial right hepatic lobeextending into the caudate lobe indeterminate in nature however given possible cecal/appendiceal malignancy, metastasis cannot be excluded and MRI is recommended for further evaluation.  BILE DUCTS: Normal caliber.  GALLBLADDER: Peripheral calcification of the gallbladder wall particularly in the fundus consistent with a porcelain gallbladder.  SPLEEN: Subcentimeter hypodensity in the anterior spleen indeterminate.  PANCREAS: Within normal limits.  ADRENALS: Within normal limits.  KIDNEYS/URETERS: Kidneys enhance bilaterally and symmetrically without hydronephrosis renal mass or renal calculus. The ureters are unremarkable.  BLADDER: Within normal limits.  BOWEL, PERITONEUM AND REPRODUCTIVE ORGANS: Heterogeneous masslike lesion which appears to be involving the cecum/appendix and intimately associated with the right adnexa with adjacent infiltration of the fat concerning for a cecal versus appendiceal carcinoma with secondary involvement of the right adnexa versus right ovarian neoplasm. Scattered colonic diverticulosis. No bowel obstruction. Soft tissue nodular density within the right paracolic gutter fat, series 2 image 75, indeterminate in nature measuring 1 cm for which a peritoneal implant cannot be excluded.  VESSELS: Atherosclerotic changes.  RETROPERITONEUM/LYMPH NODES: No lymphadenopathy.  ABDOMINAL WALL: Within normal limits.  BONES: Degenerative changes.    IMPRESSION:  Soft tissue mass centered around the right adnexa appears to be emanating from the appendix/cecum and secondarily involving the right adnexa concerning for neoplasm which is likely appendiceal/cecal in origin versus right ovarian. Consideration for colonoscopy for further evaluation.  Indeterminate hepatic lesion for which MRI is recommendedfor further evaluation. Right paracolic gutter peritoneal nodularity for which carcinomatosis cannot be excluded.      ECHOCARDIOGRAM:  TTE Echo Complete w/o Contrast w/ Doppler (21 @ 10:34) >   Impression   Summary   Endocardium is not well visualized, however, overall left ventricular   systolic function appearsnormal. Estimated left ventricular ejection   fraction is 55%.   The left atrium is moderately dilated.    TELEMETRY:   12 Lead ECG (03.15.21 @ 12:36) >  Ventricular Rate 126 BPM  Atrial Rate 136 BPM  QRS Duration 74 ms  Q-T Interval 310 ms  QTC Calculation(Bazett) 448 ms  R Axis 48 degrees  T Axis 6 degrees  Diagnosis Line Atrial fibrillation with rapid ventricular response  ST & T wave abnormality, consider inferior ischemia  Abnormal ECG  When compared with ECG of 15-MAR-2021 12:35,  Nonspecific T wave abnormality has replaced inverted T waves in Lateral leads                 69 y/o female with a PMHx of Crohns presents to the ED for evaluation. Pt reports having cough, n/v/d and feeling fatigued over the last few days. Pt fearful of being admitted and doesn't have a PCP; her  tells me he made her come, due to the fact that she was v weak, not eating and he was concerned.  Pt has new onset AF was RVR when she arrived and I believe this was the main reason her her feeling weak, she needs a CT abd to r/o IBD flare, doubt any ischemic colitis ( no blood with the diarrhea, but in view of the new AF worth ruling out). Case d/w ; pt hasn't been on any meds for years, doesn't have a GI I can call.  Pt tells me she stopped eating weeks ago.    3/16: Patient denies any complaints. Potassium is critically low and will need to be replenished prior to EGD with anesthesia.    Patient seen and examined at bedside before going to colonoscopy, was NPO due to colonoscopy   Was having diarrhea     REVIEW OF SYSTEMS:  All other review of systems is negative unless indicated above      Vital Signs Last 24 Hrs  Vital Signs Last 24 Hrs  T(C): 36.5 (17 Mar 2021 17:12), Max: 36.8 (16 Mar 2021 21:10)  T(F): 97.7 (17 Mar 2021 17:12), Max: 98.2 (16 Mar 2021 21:10)  HR: 115 (17 Mar 2021 17:12) (104 - 115)  BP: 115/65 (17 Mar 2021 17:12) (115/65 - 127/78)  BP(mean): --  RR: 18 (17 Mar 2021 08:14) (18 - 20)  SpO2: 95% (17 Mar 2021 17:12) (93% - 95%)  PHYSICAL EXAM:  Constitutional: NAD, awake and alert, well-developed  HEENT: PERR, EOMI, Normal Hearing, MMM  Neck: Soft and supple, No LAD, No JVD  Respiratory: Breath sounds are clear bilaterally, No wheezing, rales or rhonchi  Cardiovascular: S1 and S2, regular rate and rhythm, no Murmurs, gallops or rubs  Gastrointestinal: Bowel Sounds present, soft, nontender, nondistended, no guarding, no rebound  Extremities: No peripheral edema  Vascular: 2+ peripheral pulses  Neurological: A/O x 3, no focal deficits  Musculoskeletal: 5/5 strength b/l upper and lower extremities  Skin: No rashes        143  |  113<H>  |  10  ----------------------------<  84  4.1   |  22  |  0.62    Ca    8.2<L>      17 Mar 2021 06:37    TPro  6.3  /  Alb  1.9<L>  /  TBili  0.5  /  DBili  x   /  AST  86<H>  /  ALT  52  /  AlkPhos  69                           12.0   6.98  )-----------( 348      ( 17 Mar 2021 06:37 )             36.1       LIVER FUNCTIONS - ( 17 Mar 2021 06:37 )  Alb: 1.9 g/dL / Pro: 6.3 gm/dL / ALK PHOS: 69 U/L / ALT: 52 U/L / AST: 86 U/L / GGT: x             Urinalysis Basic - ( 15 Mar 2021 15:08 )    Color: Yellow / Appearance: Clear / S.005 / pH: x  Gluc: x / Ketone: Negative  / Bili: Negative / Urobili: Negative mg/dL   Blood: x / Protein: Negative mg/dL / Nitrite: Negative   Leuk Esterase: Negative / RBC: x / WBC x   Sq Epi: x / Non Sq Epi: x / Bacteria: x    RADIOLOGY:  Xray Chest 1 View- PORTABLE-Urgent (Xray Chest 1 View- PORTABLE-Urgent .) (03.15.21 @ 13:32) >  Impression: No active pulmonary disease. Mild cardiomegaly.    CT Abdomen and Pelvis w/ IV Cont (03.15.21 @ 14:27) >  EXAM:  CT ABDOMEN AND PELVIS IC                        PROCEDURE DATE:  03/15/2021    INTERPRETATION:  CLINICAL INFORMATION: Crohn's with diarrhea  COMPARISON: None.  CONTRAST/COMPLICATIONS:  IV Contrast: Omnipaque 350 / / .  Oral Contrast: None  Complications: None    PROCEDURE:  CT of the Abdomen and Pelvis was performed.  Sagittal and coronal reformats were performed.    FINDINGS:  LOWER CHEST: Within normal limits.    LIVER: Hypovascular lesion in the medial right hepatic lobeextending into the caudate lobe indeterminate in nature however given possible cecal/appendiceal malignancy, metastasis cannot be excluded and MRI is recommended for further evaluation.  BILE DUCTS: Normal caliber.  GALLBLADDER: Peripheral calcification of the gallbladder wall particularly in the fundus consistent with a porcelain gallbladder.  SPLEEN: Subcentimeter hypodensity in the anterior spleen indeterminate.  PANCREAS: Within normal limits.  ADRENALS: Within normal limits.  KIDNEYS/URETERS: Kidneys enhance bilaterally and symmetrically without hydronephrosis renal mass or renal calculus. The ureters are unremarkable.  BLADDER: Within normal limits.  BOWEL, PERITONEUM AND REPRODUCTIVE ORGANS: Heterogeneous masslike lesion which appears to be involving the cecum/appendix and intimately associated with the right adnexa with adjacent infiltration of the fat concerning for a cecal versus appendiceal carcinoma with secondary involvement of the right adnexa versus right ovarian neoplasm. Scattered colonic diverticulosis. No bowel obstruction. Soft tissue nodular density within the right paracolic gutter fat, series 2 image 75, indeterminate in nature measuring 1 cm for which a peritoneal implant cannot be excluded.  VESSELS: Atherosclerotic changes.  RETROPERITONEUM/LYMPH NODES: No lymphadenopathy.  ABDOMINAL WALL: Within normal limits.  BONES: Degenerative changes.    IMPRESSION:  Soft tissue mass centered around the right adnexa appears to be emanating from the appendix/cecum and secondarily involving the right adnexa concerning for neoplasm which is likely appendiceal/cecal in origin versus right ovarian. Consideration for colonoscopy for further evaluation.  Indeterminate hepatic lesion for which MRI is recommendedfor further evaluation. Right paracolic gutter peritoneal nodularity for which carcinomatosis cannot be excluded.      ECHOCARDIOGRAM:  TTE Echo Complete w/o Contrast w/ Doppler (21 @ 10:34) >   Impression   Summary   Endocardium is not well visualized, however, overall left ventricular   systolic function appearsnormal. Estimated left ventricular ejection   fraction is 55%.   The left atrium is moderately dilated.    TELEMETRY:   12 Lead ECG (03.15.21 @ 12:36) >  Ventricular Rate 126 BPM  Atrial Rate 136 BPM  QRS Duration 74 ms  Q-T Interval 310 ms  QTC Calculation(Bazett) 448 ms  R Axis 48 degrees  T Axis 6 degrees  Diagnosis Line Atrial fibrillation with rapid ventricular response  ST & T wave abnormality, consider inferior ischemia  Abnormal ECG  When compared with ECG of 15-MAR-2021 12:35,  Nonspecific T wave abnormality has replaced inverted T waves in Lateral leads  MEDICATIONS  (STANDING):  metoprolol tartrate 12.5 milliGRAM(s) Oral every 8 hours  sodium chloride 0.9%. 1000 milliLiter(s) (100 mL/Hr) IV Continuous <Continuous>    MEDICATIONS  (PRN):  aluminum hydroxide/magnesium hydroxide/simethicone Suspension 30 milliLiter(s) Oral every 4 hours PRN Dyspepsia  ondansetron Injectable 4 milliGRAM(s) IV Push every 6 hours PRN Nausea

## 2021-03-18 LAB
ALBUMIN SERPL ELPH-MCNC: 2.2 G/DL — LOW (ref 3.3–5)
ALP SERPL-CCNC: 76 U/L — SIGNIFICANT CHANGE UP (ref 40–120)
ALT FLD-CCNC: 47 U/L — SIGNIFICANT CHANGE UP (ref 12–78)
ANION GAP SERPL CALC-SCNC: 6 MMOL/L — SIGNIFICANT CHANGE UP (ref 5–17)
AST SERPL-CCNC: 60 U/L — HIGH (ref 15–37)
BILIRUB SERPL-MCNC: 0.5 MG/DL — SIGNIFICANT CHANGE UP (ref 0.2–1.2)
BUN SERPL-MCNC: 10 MG/DL — SIGNIFICANT CHANGE UP (ref 7–23)
CALCIUM SERPL-MCNC: 8.5 MG/DL — SIGNIFICANT CHANGE UP (ref 8.5–10.1)
CHLORIDE SERPL-SCNC: 111 MMOL/L — HIGH (ref 96–108)
CO2 SERPL-SCNC: 24 MMOL/L — SIGNIFICANT CHANGE UP (ref 22–31)
CREAT SERPL-MCNC: 0.91 MG/DL — SIGNIFICANT CHANGE UP (ref 0.5–1.3)
GLUCOSE SERPL-MCNC: 96 MG/DL — SIGNIFICANT CHANGE UP (ref 70–99)
HCT VFR BLD CALC: 40.1 % — SIGNIFICANT CHANGE UP (ref 34.5–45)
HGB BLD-MCNC: 12.9 G/DL — SIGNIFICANT CHANGE UP (ref 11.5–15.5)
MAGNESIUM SERPL-MCNC: 2.4 MG/DL — SIGNIFICANT CHANGE UP (ref 1.6–2.6)
MCHC RBC-ENTMCNC: 29.9 PG — SIGNIFICANT CHANGE UP (ref 27–34)
MCHC RBC-ENTMCNC: 32.2 GM/DL — SIGNIFICANT CHANGE UP (ref 32–36)
MCV RBC AUTO: 93 FL — SIGNIFICANT CHANGE UP (ref 80–100)
PLATELET # BLD AUTO: 393 K/UL — SIGNIFICANT CHANGE UP (ref 150–400)
POTASSIUM SERPL-MCNC: 3.6 MMOL/L — SIGNIFICANT CHANGE UP (ref 3.5–5.3)
POTASSIUM SERPL-SCNC: 3.6 MMOL/L — SIGNIFICANT CHANGE UP (ref 3.5–5.3)
PROT SERPL-MCNC: 6.9 GM/DL — SIGNIFICANT CHANGE UP (ref 6–8.3)
RBC # BLD: 4.31 M/UL — SIGNIFICANT CHANGE UP (ref 3.8–5.2)
RBC # FLD: 14 % — SIGNIFICANT CHANGE UP (ref 10.3–14.5)
SODIUM SERPL-SCNC: 141 MMOL/L — SIGNIFICANT CHANGE UP (ref 135–145)
WBC # BLD: 5.89 K/UL — SIGNIFICANT CHANGE UP (ref 3.8–10.5)
WBC # FLD AUTO: 5.89 K/UL — SIGNIFICANT CHANGE UP (ref 3.8–10.5)

## 2021-03-18 PROCEDURE — 76856 US EXAM PELVIC COMPLETE: CPT | Mod: 26

## 2021-03-18 PROCEDURE — 99232 SBSQ HOSP IP/OBS MODERATE 35: CPT

## 2021-03-18 PROCEDURE — 99233 SBSQ HOSP IP/OBS HIGH 50: CPT

## 2021-03-18 PROCEDURE — 74183 MRI ABD W/O CNTR FLWD CNTR: CPT | Mod: 26

## 2021-03-18 PROCEDURE — 76830 TRANSVAGINAL US NON-OB: CPT | Mod: 26

## 2021-03-18 PROCEDURE — 71260 CT THORAX DX C+: CPT | Mod: 26

## 2021-03-18 RX ORDER — METOPROLOL TARTRATE 50 MG
25 TABLET ORAL
Refills: 0 | Status: DISCONTINUED | OUTPATIENT
Start: 2021-03-18 | End: 2021-03-19

## 2021-03-18 RX ADMIN — Medication 25 MILLIGRAM(S): at 21:55

## 2021-03-18 RX ADMIN — Medication 25 MILLIGRAM(S): at 11:33

## 2021-03-18 RX ADMIN — Medication 12.5 MILLIGRAM(S): at 05:26

## 2021-03-18 NOTE — PROGRESS NOTE ADULT - SUBJECTIVE AND OBJECTIVE BOX
Pt seen and examined at bedside, no acute events. Pt had no complaints. Denied fever, chills, nausea, vomiting or SOB overnight.         Vital Signs Last 24 Hrs  T(C): 36.8 (18 Mar 2021 07:27), Max: 37.2 (18 Mar 2021 05:25)  T(F): 98.2 (18 Mar 2021 07:27), Max: 98.9 (18 Mar 2021 05:25)  HR: 97 (18 Mar 2021 07:27) (92 - 115)  BP: 133/83 (18 Mar 2021 07:27) (114/68 - 133/83)  BP(mean): --  RR: 18 (18 Mar 2021 07:27) (18 - 18)  SpO2: 96% (18 Mar 2021 07:27) (93% - 96%)    Labs:                            12.0   6.98  )-----------( 348      ( 17 Mar 2021 06:37 )             36.1     CBC Full  -  ( 17 Mar 2021 06:37 )  WBC Count : 6.98 K/uL  RBC Count : 3.95 M/uL  Hemoglobin : 12.0 g/dL  Hematocrit : 36.1 %  Platelet Count - Automated : 348 K/uL  Mean Cell Volume : 91.4 fl  Mean Cell Hemoglobin : 30.4 pg  Mean Cell Hemoglobin Concentration : 33.2 gm/dL  Auto Neutrophil # : x  Auto Lymphocyte # : x  Auto Monocyte # : x  Auto Eosinophil # : x  Auto Basophil # : x  Auto Neutrophil % : x  Auto Lymphocyte % : x  Auto Monocyte % : x  Auto Eosinophil % : x  Auto Basophil % : x    03-17    143  |  113<H>  |  10  ----------------------------<  84  4.1   |  22  |  0.62    Ca    8.2<L>      17 Mar 2021 06:37    TPro  6.3  /  Alb  1.9<L>  /  TBili  0.5  /  DBili  x   /  AST  86<H>  /  ALT  52  /  AlkPhos  69  03-17    LIVER FUNCTIONS - ( 17 Mar 2021 06:37 )  Alb: 1.9 g/dL / Pro: 6.3 gm/dL / ALK PHOS: 69 U/L / ALT: 52 U/L / AST: 86 U/L / GGT: x               Meds:  aluminum hydroxide/magnesium hydroxide/simethicone Suspension 30 milliLiter(s) Oral every 4 hours PRN  metoprolol tartrate 25 milliGRAM(s) Oral two times a day  ondansetron Injectable 4 milliGRAM(s) IV Push every 6 hours PRN    Physical Exam:  General: AAOx3, in NAD  HEENT: NC/AT, EOMI  Cardio: irregularly irregular  Pulm: equal air entry b/l, non labored  Abdomen: soft, obese, NT/ND

## 2021-03-18 NOTE — PROVIDER CONTACT NOTE (OTHER) - SITUATION
Spoke to Beatriz at the service is aware of the consult
DR SERVICE AWARE
answering service aware of consult.

## 2021-03-18 NOTE — CONSULT NOTE ADULT - SUBJECTIVE AND OBJECTIVE BOX
GYNECOLOGIC ONCOLOGY CONSULT NOTE    69yo  LMP age 50 w/ PMHx significant for Crohn's disease admitted due to N/V/D, fatigue, and atrial fibrillation. Patient states she has not seen a gynecologist in >10yrs.   She has had some nausea for the past 2 weeks and anorexia. No vaginal bleeding or discharge, or abdominal pain.     Last Pap Smear: >10 yrs ago  Last Mammogram: never done  Last Colonoscopy (prior to this hospitalization): 2009, diagnosed with Crohn's    Past Medical History: Crohn's disease  Surgical History: Denies  Home Meds: None  Allergies: NKDA  Family History: Breast Cancer (sister, diagnosed at 47yo, still alive), Cervical Cancer (sister, diagnosed in her 40s, s/p hysterectomy and radiation therapy, still alive)  Social History: Former smoker for 7.5pack-years. Social drinker. Denies drug use. Currently retired, lives with her . Independent. ECOG 0.     REVIEW OF SYSTEMS:  CONSTITUTIONAL: No fever  EYES: No eye pain, visual disturbances, or discharge  ENMT:  No difficulty hearing, tinnitus, vertigo; No sinus or throat pain  NECK: No pain or stiffness  BREASTS: No pain, masses, or nipple discharge  RESPIRATORY: No cough, wheezing, chills or hemoptysis; No shortness of breath  CARDIOVASCULAR: No chest pain, palpitations, dizziness, or leg swelling  GASTROINTESTINAL: No abdominal or epigastric pain. No nausea, vomiting, or hematemesis; No diarrhea or constipation. No melena or hematochezia.  GENITOURINARY: No dysuria, frequency, hematuria, or incontinence  NEUROLOGICAL: No headaches, memory loss, loss of strength, numbness, or tremors  SKIN: No itching, burning, rashes, or lesions   LYMPH NODES: No enlarged glands  ENDOCRINE: No heat or cold intolerance; No hair loss  MUSCULOSKELETAL: No joint pain or swelling; No muscle, back, or extremity pain  PSYCHIATRIC: No depression, anxiety, mood swings, or difficulty sleeping  HEME/LYMPH: No easy bruising, or bleeding gums  ALLERY AND IMMUNOLOGIC: No hives or eczema    OBJECTIVE FINDINGS:  Vital Signs Last 24 Hrs  T(C): 36.8 (18 Mar 2021 07:27), Max: 37.2 (18 Mar 2021 05:25)  T(F): 98.2 (18 Mar 2021 07:27), Max: 98.9 (18 Mar 2021 05:25)  HR: 97 (18 Mar 2021 07:27) (92 - 115)  BP: 133/83 (18 Mar 2021 07:27) (114/68 - 133/83)  RR: 18 (18 Mar 2021 07:27) (18 - 18)  SpO2: 96% (18 Mar 2021 07:27) (93% - 96%)    PHYSICAL EXAM:  GENERAL: NAD, well-developed  HEAD:  Atraumatic, Normocephalic  EYES: EOMI, PERRLA, conjunctiva and sclera clear  NERVOUS SYSTEM:  Alert & Oriented X3, Good concentration  CHEST/LUNG: Clear to percussion bilaterally; No rales, rhonchi, wheezing, or rubs  HEART: Regular rate and rhythm; No murmurs, rubs, or gallops  ABDOMEN: Soft, Nontender, Nondistended; Bowel sounds present, No rebound, No guarding  EXTREMITIES:  2+ Peripheral Pulses, No clubbing, cyanosis, or edema, Angela's sign negative  LYMPH: No lymphadenopathy noted  SKIN: No rashes or lesions  PELVIC: deferred    LABS:                        12.9   5.89  )-----------( 393      ( 18 Mar 2021 14:01 )             40.1     03-18    141  |  111<H>  |  10  ----------------------------<  96  3.6   |  24  |  0.91    Ca    8.5      18 Mar 2021 14:01  Mg     2.4     -18    TPro  6.9  /  Alb  2.2<L>  /  TBili  0.5  /  DBili  x   /  AST  60<H>  /  ALT  47  /  AlkPhos  76  03-18    RADIOLOGY & ADDITIONAL STUDIES:  < from: CT Abdomen and Pelvis w/ IV Cont (03.15.21 @ 14:27) >    INTERPRETATION:  CLINICAL INFORMATION: Crohn's with diarrhea    COMPARISON: None.    CONTRAST/COMPLICATIONS:  IV Contrast: Omnipaque 350 / / .  Oral Contrast: None  Complications: None    PROCEDURE:  CT of the Abdomen and Pelvis was performed.  Sagittal and coronal reformats were performed.    FINDINGS:  LOWER CHEST: Within normal limits.    LIVER: Hypovascular lesion in the medial right hepatic lobeextending into the caudate lobe indeterminate in nature however given possible cecal/appendiceal malignancy, metastasis cannot be excluded and MRI is recommended for further evaluation.  BILE DUCTS: Normal caliber.  GALLBLADDER: Peripheral calcification of the gallbladder wall particularly in the fundus consistent with a porcelain gallbladder.  SPLEEN: Subcentimeter hypodensity in the anterior spleen indeterminate.  PANCREAS: Within normal limits.  ADRENALS: Within normal limits.  KIDNEYS/URETERS: Kidneys enhance bilaterally and symmetrically without hydronephrosis renal mass or renal calculus. The ureters are unremarkable.    BLADDER: Within normal limits.      BOWEL, PERITONEUM AND REPRODUCTIVE ORGANS: Heterogeneous masslike lesion which appears to be involving the cecum/appendix and intimately associated with the right adnexa with adjacent infiltration of the fat concerning for a cecal versus appendiceal carcinoma with secondary involvement of the right adnexa versus right ovarian neoplasm. Scattered colonic diverticulosis. No bowel obstruction. Soft tissue nodular density within the right paracolic gutter fat, series 2 image 75, indeterminate in nature measuring 1 cm for which a peritoneal implant cannot be excluded.  VESSELS: Atherosclerotic changes.  RETROPERITONEUM/LYMPH NODES: No lymphadenopathy.  ABDOMINAL WALL: Within normal limits.  BONES: Degenerative changes.    IMPRESSION:  Soft tissue mass centered around the right adnexa appears to be emanating from the appendix/cecum and secondarily involving the right adnexa concerning for neoplasm which is likely appendiceal/cecal in origin versus right ovarian. Consideration for colonoscopy for further evaluation.    Indeterminate hepatic lesion for which MRI is recommendedfor further evaluation. Right paracolic gutter peritoneal nodularity for which carcinomatosis cannot be excluded.    < end of copied text >    < from: US Transvaginal (21 @ 13:19) >  PROCEDURE DATE:  2021          INTERPRETATION:  CLINICAL INFORMATION: Right adnexal mass on CAT scan    LMP: Postmenopausal    COMPARISON: CTscan 3/15/2021    TECHNIQUE:  Endovaginal and transabdominal pelvic sonogram. Color and Spectral Doppler was performed.    FINDINGS:    Uterus: 5.9 cm x 2.3 cm x 2.5 cm. Within normal limits.  Endometrium: 4 mm. Within normal limits.    Right ovary: 4.0 x 4.0 x 3.7 cm. Enlarged with central hypoechoic area. Flow is appreciated with duplex Doppler imaging.  Left ovary: Not seen. No left adnexal mass    Fluid: None.    IMPRESSION:  Unremarkable appearance of the uterus.  Left ovary is not identified  Right adnexal solid rounded structure may represent enlarged ovary versus mass of unclear origin. Consider evaluation with MR imaging..    < end of copied text >    Cancer Antigen, 125 (21 @ 06:32)    Cancer Antigen, 125: 46: Method: Roche Electrochemiluminescence Immuno Assay  Values obtained with different assay methods or kits cannot be  used interchangeably.  Results cannot be interpreted as absolute evidence of the presence  or absence of malignant disease. U/mL    Hospital Medications:   MEDICATIONS  (STANDING):  metoprolol tartrate 25 milliGRAM(s) Oral two times a day    MEDICATIONS  (PRN):  aluminum hydroxide/magnesium hydroxide/simethicone Suspension 30 milliLiter(s) Oral every 4 hours PRN Dyspepsia  ondansetron Injectable 4 milliGRAM(s) IV Push every 6 hours PRN Nausea

## 2021-03-18 NOTE — CONSULT NOTE ADULT - SUBJECTIVE AND OBJECTIVE BOX
Pt is a 71 yo F who was admitted to  for weakness, fatigue and decreased PO intake for the past 2 weeks associated with nausea, chills and night sweats. Pt reports weight loss during these past two weeks but no weight changes prior. Pt reports having bowel movements. Denies abdominal pain. States she last had a colonoscopy in 2009 with Dr. Barragan and was diagnosed with Crohn's. Was placed on medication and medication was stopped after a flare in 2015. Has not been on any medication since. Of note, pt was found to be in Afib with RVR in the ED.    PAST MEDICAL & SURGICAL HISTORY:  Crohn&#x27;s disease    Vital Signs Last 24 Hrs  T(C): 36.8 (18 Mar 2021 07:27), Max: 37.2 (18 Mar 2021 05:25)  T(F): 98.2 (18 Mar 2021 07:27), Max: 98.9 (18 Mar 2021 05:25)  HR: 97 (18 Mar 2021 07:27) (92 - 115)  BP: 133/83 (18 Mar 2021 07:27) (114/68 - 133/83)  BP(mean): --  RR: 18 (18 Mar 2021 07:27) (18 - 18)  SpO2: 96% (18 Mar 2021 07:27) (93% - 96%)    Physical Exam:  General: AAOx3, in NAD  HEENT: NC/AT, EOMI  Cardio: irregularly irregular  Pulm: equal air entry b/l, non labored  Abdomen: soft, obese, NT/ND      Labs:                        12.0   6.98  )-----------( 348      ( 17 Mar 2021 06:37 )             36.1   03-17    143  |  113<H>  |  10  ----------------------------<  84  4.1   |  22  |  0.62    Ca    8.2<L>      17 Mar 2021 06:37    TPro  6.3  /  Alb  1.9<L>  /  TBili  0.5  /  DBili  x   /  AST  86<H>  /  ALT  52  /  AlkPhos  69  03-17    CEA 3.6   46    Imaging:  < from: CT Abdomen and Pelvis w/ IV Cont (03.15.21 @ 14:27) >    EXAM:  CT ABDOMEN AND PELVIS IC                            PROCEDURE DATE:  03/15/2021      < end of copied text >  < from: CT Abdomen and Pelvis w/ IV Cont (03.15.21 @ 14:27) >  IMPRESSION:  Soft tissue mass centered around the right adnexa appears to be emanating from the appendix/cecum and secondarily involving the right adnexa concerning for neoplasm which is likely appendiceal/cecal in origin versus right ovarian. Consideration for colonoscopy for further evaluation.    Indeterminate hepatic lesion for which MRI is recommendedfor further evaluation. Right paracolic gutter peritoneal nodularity for which carcinomatosis cannot be excluded.    < end of copied text >

## 2021-03-18 NOTE — PROGRESS NOTE ADULT - SUBJECTIVE AND OBJECTIVE BOX
69 y/o female with a PMHx of Crohns presents to the ED for evaluation. Pt reports having cough, n/v/d and feeling fatigued over the last few days. Pt fearful of being admitted and doesn't have a PCP; her  tells me he made her come, due to the fact that she was v weak, not eating and he was concerned.  Pt has new onset AF was RVR when she arrived and I believe this was the main reason her her feeling weak, she needs a CT abd to r/o IBD flare, doubt any ischemic colitis ( no blood with the diarrhea, but in view of the new AF worth ruling out). Case d/w ; pt hasn't been on any meds for years, doesn't have a GI I can call.  Pt tells me she stopped eating weeks ago.    3/16: Patient denies any complaints. Potassium is critically low and will need to be replenished prior to EGD with anesthesia.    Patient seen and examined at bedside before going to colonoscopy, was NPO due to colonoscopy   Was having diarrhea       REVIEW OF SYSTEMS:  All other review of systems is negative unless indicated above    Vital Signs Last 24 Hrs  T(C): 36.8 (18 Mar 2021 07:27), Max: 37.2 (18 Mar 2021 05:25)  T(F): 98.2 (18 Mar 2021 07:27), Max: 98.9 (18 Mar 2021 05:25)  HR: 97 (18 Mar 2021 07:27) (92 - 115)  BP: 133/83 (18 Mar 2021 07:27) (114/68 - 133/83)  BP(mean): --  RR: 18 (18 Mar 2021 07:27) (18 - 18)  SpO2: 96% (18 Mar 2021 07:27) (93% - 96%)    PHYSICAL EXAM:  Constitutional: NAD, awake and alert, well-developed  HEENT: PERR, EOMI, Normal Hearing, MMM  Neck: Soft and supple, No LAD, No JVD  Respiratory: Breath sounds are clear bilaterally, No wheezing, rales or rhonchi  Cardiovascular: S1 and S2, regular rate and rhythm, no Murmurs, gallops or rubs  Gastrointestinal: Bowel Sounds present, soft, nontender, nondistended, no guarding, no rebound  Extremities: No peripheral edema  Vascular: 2+ peripheral pulses  Neurological: A/O x 3, no focal deficits  Musculoskeletal: 5/5 strength b/l upper and lower extremities  Skin: No rashes        143  |  113<H>  |  10  ----------------------------<  84  4.1   |  22  |  0.62    Ca    8.2<L>      17 Mar 2021 06:37    TPro  6.3  /  Alb  1.9<L>  /  TBili  0.5  /  DBili  x   /  AST  86<H>  /  ALT  52  /  AlkPhos  69                              12.0   6.98  )-----------( 348      ( 17 Mar 2021 06:37 )             36.1             LIVER FUNCTIONS - ( 17 Mar 2021 06:37 )  Alb: 1.9 g/dL / Pro: 6.3 gm/dL / ALK PHOS: 69 U/L / ALT: 52 U/L / AST: 86 U/L / GGT: x                     LIVER FUNCTIONS - ( 17 Mar 2021 06:37 )  Alb: 1.9 g/dL / Pro: 6.3 gm/dL / ALK PHOS: 69 U/L / ALT: 52 U/L / AST: 86 U/L / GGT: x             Urinalysis Basic - ( 15 Mar 2021 15:08 )    Color: Yellow / Appearance: Clear / S.005 / pH: x  Gluc: x / Ketone: Negative  / Bili: Negative / Urobili: Negative mg/dL   Blood: x / Protein: Negative mg/dL / Nitrite: Negative   Leuk Esterase: Negative / RBC: x / WBC x   Sq Epi: x / Non Sq Epi: x / Bacteria: x    RADIOLOGY:  Xray Chest 1 View- PORTABLE-Urgent (Xray Chest 1 View- PORTABLE-Urgent .) (03.15.21 @ 13:32) >  Impression: No active pulmonary disease. Mild cardiomegaly.    CT Abdomen and Pelvis w/ IV Cont (03.15.21 @ 14:27) >  EXAM:  CT ABDOMEN AND PELVIS IC                        PROCEDURE DATE:  03/15/2021    INTERPRETATION:  CLINICAL INFORMATION: Crohn's with diarrhea  COMPARISON: None.  CONTRAST/COMPLICATIONS:  IV Contrast: Omnipaque 350 / / .  Oral Contrast: None  Complications: None    PROCEDURE:  CT of the Abdomen and Pelvis was performed.  Sagittal and coronal reformats were performed.    FINDINGS:  LOWER CHEST: Within normal limits.    LIVER: Hypovascular lesion in the medial right hepatic lobeextending into the caudate lobe indeterminate in nature however given possible cecal/appendiceal malignancy, metastasis cannot be excluded and MRI is recommended for further evaluation.  BILE DUCTS: Normal caliber.  GALLBLADDER: Peripheral calcification of the gallbladder wall particularly in the fundus consistent with a porcelain gallbladder.  SPLEEN: Subcentimeter hypodensity in the anterior spleen indeterminate.  PANCREAS: Within normal limits.  ADRENALS: Within normal limits.  KIDNEYS/URETERS: Kidneys enhance bilaterally and symmetrically without hydronephrosis renal mass or renal calculus. The ureters are unremarkable.  BLADDER: Within normal limits.  BOWEL, PERITONEUM AND REPRODUCTIVE ORGANS: Heterogeneous masslike lesion which appears to be involving the cecum/appendix and intimately associated with the right adnexa with adjacent infiltration of the fat concerning for a cecal versus appendiceal carcinoma with secondary involvement of the right adnexa versus right ovarian neoplasm. Scattered colonic diverticulosis. No bowel obstruction. Soft tissue nodular density within the right paracolic gutter fat, series 2 image 75, indeterminate in nature measuring 1 cm for which a peritoneal implant cannot be excluded.  VESSELS: Atherosclerotic changes.  RETROPERITONEUM/LYMPH NODES: No lymphadenopathy.  ABDOMINAL WALL: Within normal limits.  BONES: Degenerative changes.    IMPRESSION:  Soft tissue mass centered around the right adnexa appears to be emanating from the appendix/cecum and secondarily involving the right adnexa concerning for neoplasm which is likely appendiceal/cecal in origin versus right ovarian. Consideration for colonoscopy for further evaluation.  Indeterminate hepatic lesion for which MRI is recommendedfor further evaluation. Right paracolic gutter peritoneal nodularity for which carcinomatosis cannot be excluded.      ECHOCARDIOGRAM:  TTE Echo Complete w/o Contrast w/ Doppler (21 @ 10:34) >   Impression   Summary   Endocardium is not well visualized, however, overall left ventricular   systolic function appearsnormal. Estimated left ventricular ejection   fraction is 55%.   The left atrium is moderately dilated.    TELEMETRY:   12 Lead ECG (03.15.21 @ 12:36) >  Ventricular Rate 126 BPM  Atrial Rate 136 BPM  QRS Duration 74 ms  Q-T Interval 310 ms  QTC Calculation(Bazett) 448 ms  R Axis 48 degrees  T Axis 6 degrees  Diagnosis Line Atrial fibrillation with rapid ventricular response  ST & T wave abnormality, consider inferior ischemia  Abnormal ECG  When compared with ECG of 15-MAR-2021 12:35,  Nonspecific T wave abnormality has replaced inverted T waves in Lateral leads    MEDICATIONS  (STANDING):  metoprolol tartrate 25 milliGRAM(s) Oral two times a day    MEDICATIONS  (PRN):  aluminum hydroxide/magnesium hydroxide/simethicone Suspension 30 milliLiter(s) Oral every 4 hours PRN Dyspepsia  ondansetron Injectable 4 milliGRAM(s) IV Push every 6 hours PRN Nausea     69 y/o female with a PMHx of Crohns presents to the ED for evaluation. Pt reports having cough, n/v/d and feeling fatigued over the last few days. Pt fearful of being admitted and doesn't have a PCP; her  tells me he made her come, due to the fact that she was v weak, not eating and he was concerned.  Pt has new onset AF was RVR when she arrived and I believe this was the main reason her her feeling weak, she needs a CT abd to r/o IBD flare, doubt any ischemic colitis ( no blood with the diarrhea, but in view of the new AF worth ruling out). Case d/w ; pt hasn't been on any meds for years, doesn't have a GI I can call.  Pt tells me she stopped eating weeks ago.    3/16: Patient denies any complaints. Potassium is critically low and will need to be replenished prior to EGD with anesthesia.    Patient seen and examined at bedside before going to colonoscopy, was NPO due to colonoscopy   Was having diarrhea    Patient denies any pain, I answered all her questions about colonoscopy finding yesterday, aware  of a  mass   , given support      REVIEW OF SYSTEMS:  All other review of systems is negative unless indicated above    Vital Signs Last 24 Hrs  T(C): 36.8 (18 Mar 2021 07:27), Max: 37.2 (18 Mar 2021 05:25)  T(F): 98.2 (18 Mar 2021 07:27), Max: 98.9 (18 Mar 2021 05:25)  HR: 97 (18 Mar 2021 07:27) (92 - 115)  BP: 133/83 (18 Mar 2021 07:27) (114/68 - 133/83)  BP(mean): --  RR: 18 (18 Mar 2021 07:27) (18 - 18)  SpO2: 96% (18 Mar 2021 07:27) (93% - 96%)    PHYSICAL EXAM:  Constitutional: NAD, awake and alert, well-developed  HEENT: PERR, EOMI, Normal Hearing, MMM  Neck: Soft and supple, No LAD, No JVD  Respiratory: Breath sounds are clear bilaterally, No wheezing, rales or rhonchi  Cardiovascular: S1 and S2, regular rate and rhythm, no Murmurs, gallops or rubs  Gastrointestinal: Bowel Sounds present, soft, nontender, nondistended, no guarding, no rebound  Extremities: No peripheral edema  Vascular: 2+ peripheral pulses  Neurological: A/O x 3, no focal deficits  Musculoskeletal: 5/5 strength b/l upper and lower extremities  Skin: No rashes        143  |  113<H>  |  10  ----------------------------<  84  4.1   |  22  |  0.62    Ca    8.2<L>      17 Mar 2021 06:37    TPro  6.3  /  Alb  1.9<L>  /  TBili  0.5  /  DBili  x   /  AST  86<H>  /  ALT  52  /  AlkPhos  69                              12.0   6.98  )-----------( 348      ( 17 Mar 2021 06:37 )             36.1             LIVER FUNCTIONS - ( 17 Mar 2021 06:37 )  Alb: 1.9 g/dL / Pro: 6.3 gm/dL / ALK PHOS: 69 U/L / ALT: 52 U/L / AST: 86 U/L / GGT: x                     LIVER FUNCTIONS - ( 17 Mar 2021 06:37 )  Alb: 1.9 g/dL / Pro: 6.3 gm/dL / ALK PHOS: 69 U/L / ALT: 52 U/L / AST: 86 U/L / GGT: x             Urinalysis Basic - ( 15 Mar 2021 15:08 )    Color: Yellow / Appearance: Clear / S.005 / pH: x  Gluc: x / Ketone: Negative  / Bili: Negative / Urobili: Negative mg/dL   Blood: x / Protein: Negative mg/dL / Nitrite: Negative   Leuk Esterase: Negative / RBC: x / WBC x   Sq Epi: x / Non Sq Epi: x / Bacteria: x    RADIOLOGY:  Xray Chest 1 View- PORTABLE-Urgent (Xray Chest 1 View- PORTABLE-Urgent .) (03.15.21 @ 13:32) >  Impression: No active pulmonary disease. Mild cardiomegaly.    CT Abdomen and Pelvis w/ IV Cont (03.15.21 @ 14:27) >  EXAM:  CT ABDOMEN AND PELVIS IC                        PROCEDURE DATE:  03/15/2021    INTERPRETATION:  CLINICAL INFORMATION: Crohn's with diarrhea  COMPARISON: None.  CONTRAST/COMPLICATIONS:  IV Contrast: Omnipaque 350 / / .  Oral Contrast: None  Complications: None    PROCEDURE:  CT of the Abdomen and Pelvis was performed.  Sagittal and coronal reformats were performed.    FINDINGS:  LOWER CHEST: Within normal limits.    LIVER: Hypovascular lesion in the medial right hepatic lobeextending into the caudate lobe indeterminate in nature however given possible cecal/appendiceal malignancy, metastasis cannot be excluded and MRI is recommended for further evaluation.  BILE DUCTS: Normal caliber.  GALLBLADDER: Peripheral calcification of the gallbladder wall particularly in the fundus consistent with a porcelain gallbladder.  SPLEEN: Subcentimeter hypodensity in the anterior spleen indeterminate.  PANCREAS: Within normal limits.  ADRENALS: Within normal limits.  KIDNEYS/URETERS: Kidneys enhance bilaterally and symmetrically without hydronephrosis renal mass or renal calculus. The ureters are unremarkable.  BLADDER: Within normal limits.  BOWEL, PERITONEUM AND REPRODUCTIVE ORGANS: Heterogeneous masslike lesion which appears to be involving the cecum/appendix and intimately associated with the right adnexa with adjacent infiltration of the fat concerning for a cecal versus appendiceal carcinoma with secondary involvement of the right adnexa versus right ovarian neoplasm. Scattered colonic diverticulosis. No bowel obstruction. Soft tissue nodular density within the right paracolic gutter fat, series 2 image 75, indeterminate in nature measuring 1 cm for which a peritoneal implant cannot be excluded.  VESSELS: Atherosclerotic changes.  RETROPERITONEUM/LYMPH NODES: No lymphadenopathy.  ABDOMINAL WALL: Within normal limits.  BONES: Degenerative changes.    IMPRESSION:  Soft tissue mass centered around the right adnexa appears to be emanating from the appendix/cecum and secondarily involving the right adnexa concerning for neoplasm which is likely appendiceal/cecal in origin versus right ovarian. Consideration for colonoscopy for further evaluation.  Indeterminate hepatic lesion for which MRI is recommendedfor further evaluation. Right paracolic gutter peritoneal nodularity for which carcinomatosis cannot be excluded.      ECHOCARDIOGRAM:  TTE Echo Complete w/o Contrast w/ Doppler (21 @ 10:34) >   Impression   Summary   Endocardium is not well visualized, however, overall left ventricular   systolic function appears normal. Estimated left ventricular ejection   fraction is 55%.   The left atrium is moderately dilated.    TELEMETRY:   12 Lead ECG (03.15.21 @ 12:36) >  Ventricular Rate 126 BPM  Atrial Rate 136 BPM  QRS Duration 74 ms  Q-T Interval 310 ms  QTC Calculation(Bazett) 448 ms  R Axis 48 degrees  T Axis 6 degrees  Diagnosis Line Atrial fibrillation with rapid ventricular response  ST & T wave abnormality, consider inferior ischemia  Abnormal ECG  When compared with ECG of 15-MAR-2021 12:35,  Nonspecific T wave abnormality has replaced inverted T waves in Lateral leads    MEDICATIONS  (STANDING):  metoprolol tartrate 25 milliGRAM(s) Oral two times a day    MEDICATIONS  (PRN):  aluminum hydroxide/magnesium hydroxide/simethicone Suspension 30 milliLiter(s) Oral every 4 hours PRN Dyspepsia  ondansetron Injectable 4 milliGRAM(s) IV Push every 6 hours PRN Nausea

## 2021-03-18 NOTE — PROGRESS NOTE ADULT - ASSESSMENT
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    Plan:  -f/u MRI to evaluate liver lesion and  peritoneal nodule seen on CT  -recommend chest CT for staging  -pt may need liver bx depending on MRI results  -will f/u colonoscopy biopsy results  -medical management as per primary team  -colorectal surgery will follow    Plan discussed with Dr. Jacobo

## 2021-03-18 NOTE — PROGRESS NOTE ADULT - ASSESSMENT
69 y/o female with a PMHx of Crohns presents with cough, n/v/d and feeling fatigued over the last few days. New onset afib noted in ED. Patient has no PCP or recent medical care.    # Weakness, nausea, diarrhea,   abdominal discomfort not pain  -r/o Crohn's exacerbation vs ischemic colitis  -S/P zosyn x1   -IVF, rate at 80 in case there is CHF component  -CT abd PO contrast only:   Soft tissue mass centered around the right adnexa appears to be emanating from the appendix/cecum and secondarily involving the right adnexa concerning for neoplasm which is likely appendiceal/cecal in origin versus right ovarian.   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 The left atrium is moderately dilated.  -cardio consulted;   HR better controlled       #HILARY, hyponatremia sec to dehydration  -s/p NS@80  -Resolved     #Diet,  #GOC:   - Code status not addressed pt is too anxious for me to start discussion at this time    DISPO: Pending further work up for possible neoplasm     Family Communication: Spouse Fiona Ng [phone] updated daily

## 2021-03-19 ENCOUNTER — RESULT REVIEW (OUTPATIENT)
Age: 71
End: 2021-03-19

## 2021-03-19 LAB
-  STREPTOCOCCUS ANGINOSUS GROUP: SIGNIFICANT CHANGE UP
ALBUMIN SERPL ELPH-MCNC: 2.1 G/DL — LOW (ref 3.3–5)
ALP SERPL-CCNC: 70 U/L — SIGNIFICANT CHANGE UP (ref 40–120)
ALT FLD-CCNC: 45 U/L — SIGNIFICANT CHANGE UP (ref 12–78)
ANION GAP SERPL CALC-SCNC: 7 MMOL/L — SIGNIFICANT CHANGE UP (ref 5–17)
AST SERPL-CCNC: 52 U/L — HIGH (ref 15–37)
BASOPHILS # BLD AUTO: 0.02 K/UL — SIGNIFICANT CHANGE UP (ref 0–0.2)
BASOPHILS NFR BLD AUTO: 0.4 % — SIGNIFICANT CHANGE UP (ref 0–2)
BILIRUB SERPL-MCNC: 0.6 MG/DL — SIGNIFICANT CHANGE UP (ref 0.2–1.2)
BUN SERPL-MCNC: 10 MG/DL — SIGNIFICANT CHANGE UP (ref 7–23)
CALCIUM SERPL-MCNC: 8.4 MG/DL — LOW (ref 8.5–10.1)
CANCER AG19-9 SERPL-ACNC: 7 U/ML — SIGNIFICANT CHANGE UP
CHLORIDE SERPL-SCNC: 110 MMOL/L — HIGH (ref 96–108)
CO2 SERPL-SCNC: 23 MMOL/L — SIGNIFICANT CHANGE UP (ref 22–31)
CREAT SERPL-MCNC: 0.72 MG/DL — SIGNIFICANT CHANGE UP (ref 0.5–1.3)
EOSINOPHIL # BLD AUTO: 0.05 K/UL — SIGNIFICANT CHANGE UP (ref 0–0.5)
EOSINOPHIL NFR BLD AUTO: 1 % — SIGNIFICANT CHANGE UP (ref 0–6)
GLUCOSE SERPL-MCNC: 88 MG/DL — SIGNIFICANT CHANGE UP (ref 70–99)
GRAM STN FLD: SIGNIFICANT CHANGE UP
HCT VFR BLD CALC: 40.9 % — SIGNIFICANT CHANGE UP (ref 34.5–45)
HGB BLD-MCNC: 13.1 G/DL — SIGNIFICANT CHANGE UP (ref 11.5–15.5)
IMM GRANULOCYTES NFR BLD AUTO: 0.6 % — SIGNIFICANT CHANGE UP (ref 0–1.5)
LYMPHOCYTES # BLD AUTO: 1.07 K/UL — SIGNIFICANT CHANGE UP (ref 1–3.3)
LYMPHOCYTES # BLD AUTO: 20.8 % — SIGNIFICANT CHANGE UP (ref 13–44)
MCHC RBC-ENTMCNC: 30.3 PG — SIGNIFICANT CHANGE UP (ref 27–34)
MCHC RBC-ENTMCNC: 32 GM/DL — SIGNIFICANT CHANGE UP (ref 32–36)
MCV RBC AUTO: 94.7 FL — SIGNIFICANT CHANGE UP (ref 80–100)
METHOD TYPE: SIGNIFICANT CHANGE UP
MONOCYTES # BLD AUTO: 0.41 K/UL — SIGNIFICANT CHANGE UP (ref 0–0.9)
MONOCYTES NFR BLD AUTO: 8 % — SIGNIFICANT CHANGE UP (ref 2–14)
NEUTROPHILS # BLD AUTO: 3.57 K/UL — SIGNIFICANT CHANGE UP (ref 1.8–7.4)
NEUTROPHILS NFR BLD AUTO: 69.2 % — SIGNIFICANT CHANGE UP (ref 43–77)
PLATELET # BLD AUTO: 348 K/UL — SIGNIFICANT CHANGE UP (ref 150–400)
POTASSIUM SERPL-MCNC: 4 MMOL/L — SIGNIFICANT CHANGE UP (ref 3.5–5.3)
POTASSIUM SERPL-SCNC: 4 MMOL/L — SIGNIFICANT CHANGE UP (ref 3.5–5.3)
PROT SERPL-MCNC: 6.7 GM/DL — SIGNIFICANT CHANGE UP (ref 6–8.3)
RBC # BLD: 4.32 M/UL — SIGNIFICANT CHANGE UP (ref 3.8–5.2)
RBC # FLD: 14 % — SIGNIFICANT CHANGE UP (ref 10.3–14.5)
SODIUM SERPL-SCNC: 140 MMOL/L — SIGNIFICANT CHANGE UP (ref 135–145)
WBC # BLD: 5.15 K/UL — SIGNIFICANT CHANGE UP (ref 3.8–10.5)
WBC # FLD AUTO: 5.15 K/UL — SIGNIFICANT CHANGE UP (ref 3.8–10.5)

## 2021-03-19 PROCEDURE — 99231 SBSQ HOSP IP/OBS SF/LOW 25: CPT | Mod: GC

## 2021-03-19 PROCEDURE — 99233 SBSQ HOSP IP/OBS HIGH 50: CPT

## 2021-03-19 PROCEDURE — 10009 FNA BX W/CT GDN 1ST LES: CPT

## 2021-03-19 PROCEDURE — 88305 TISSUE EXAM BY PATHOLOGIST: CPT | Mod: 26

## 2021-03-19 PROCEDURE — 88173 CYTOPATH EVAL FNA REPORT: CPT | Mod: 26

## 2021-03-19 RX ORDER — PIPERACILLIN AND TAZOBACTAM 4; .5 G/20ML; G/20ML
3.38 INJECTION, POWDER, LYOPHILIZED, FOR SOLUTION INTRAVENOUS EVERY 8 HOURS
Refills: 0 | Status: DISCONTINUED | OUTPATIENT
Start: 2021-03-19 | End: 2021-03-20

## 2021-03-19 RX ORDER — METOPROLOL TARTRATE 50 MG
25 TABLET ORAL EVERY 8 HOURS
Refills: 0 | Status: DISCONTINUED | OUTPATIENT
Start: 2021-03-19 | End: 2021-03-23

## 2021-03-19 RX ORDER — PIPERACILLIN AND TAZOBACTAM 4; .5 G/20ML; G/20ML
3.38 INJECTION, POWDER, LYOPHILIZED, FOR SOLUTION INTRAVENOUS ONCE
Refills: 0 | Status: COMPLETED | OUTPATIENT
Start: 2021-03-19 | End: 2021-03-19

## 2021-03-19 RX ADMIN — Medication 25 MILLIGRAM(S): at 13:41

## 2021-03-19 RX ADMIN — PIPERACILLIN AND TAZOBACTAM 25 GRAM(S): 4; .5 INJECTION, POWDER, LYOPHILIZED, FOR SOLUTION INTRAVENOUS at 21:46

## 2021-03-19 RX ADMIN — PIPERACILLIN AND TAZOBACTAM 200 GRAM(S): 4; .5 INJECTION, POWDER, LYOPHILIZED, FOR SOLUTION INTRAVENOUS at 14:44

## 2021-03-19 RX ADMIN — Medication 25 MILLIGRAM(S): at 21:46

## 2021-03-19 NOTE — PROGRESS NOTE ADULT - ASSESSMENT
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    Plan:  -f/u IR biopsy   -will f/u colonoscopy biopsy results  -medical management as per primary team  -surg onc and gyn onc on board  -colorectal surgery will follow    Plan discussed with Dr. Jacobo

## 2021-03-19 NOTE — PROGRESS NOTE ADULT - SUBJECTIVE AND OBJECTIVE BOX
REASON FOR VISIT:  AF    HPI:  71 y/o woman with a history of Crohns Disease and lack of regular medical attention admitted on 3/15/21 with new onset AF with possible Crohns' exacerbation vs ischemic colitis and with abdominal CT revealing a soft tissue mass around the right adnexa.    3/16/'21: no complaints. discussed CT scan findings. colonoscopy rescheduled tomorrow K low today. discussed CV pt reluctant to do this.  3/17/21:  No new complaints; occasional palpitations.  3/18/21:  Upset about appendiceal tumor; no cardiac symptoms.  3/19/21:  No new complaints; awaiting plan from surgical teams,    MEDICATIONS  (STANDING):  metoprolol tartrate 25 milliGRAM(s) Oral two times a day    MEDICATIONS  (PRN):  aluminum hydroxide/magnesium hydroxide/simethicone Suspension 30 milliLiter(s) Oral every 4 hours PRN Dyspepsia  ondansetron Injectable 4 milliGRAM(s) IV Push every 6 hours PRN Nausea    Vital Signs Last 24 Hrs  T(C): 36.7 (18 Mar 2021 20:28), Max: 36.7 (18 Mar 2021 20:28)  T(F): 98.1 (18 Mar 2021 20:28), Max: 98.1 (18 Mar 2021 20:28)  HR: 92 (18 Mar 2021 21:54) (92 - 94)  BP: 124/77 (18 Mar 2021 21:54) (116/77 - 124/77)  SpO2: 97% (18 Mar 2021 20:28) (97% - 97%)    PHYSICAL EXAM:  Constitutional: NAD, awake and alert, obese  Respiratory: Breath sounds are clear bilaterally  Cardiovascular: Irregular, normal rate  Gastrointestinal: Bowel Sounds present, abdomen is soft, nontender.   Extremities: No edema.    LABS:                             12.9   5.89  )-----------( 393      ( 18 Mar 2021 14:01 )             40.1     141  |  111<H>  |  10  ----------------------------<  96  3.6   |  24  |  0.91    Ca    8.5      18 Mar 2021 14:01  Mg     2.4     03-18    TPro  6.9  /  Alb  2.2<L>  /  TBili  0.5  /  DBili  x   /  AST  60<H>  /  ALT  47  /  AlkPhos  76  03-18    12 Lead ECG (03.15.21 @ 12:36):  Atrial fibrillation with rapid ventricular response  ST & T wave abnormality, consider inferior ischemia    CT Abdomen and Pelvis w/ IV Cont (03.15.21 @ 14:27):  Soft tissue mass centered around the right adnexa appears to be emanating from the appendix/cecum and secondarily involving the right adnexa concerning for neoplasm which is likely appendiceal/cecal in origin versus right ovarian. Consideration for colonoscopy for further evaluation. Indeterminate hepatic lesion for which MRI is recommended for further evaluation. Right paracolic gutter peritoneal nodularity for which carcinomatosis cannot be excluded.    TTE Echo Complete w/o Contrast w/ Doppler (03.16.21 @ 10:34):   Endocardium is not well visualized, however, overall left ventricular systolic function appears normal. Estimated left ventricular ejection fraction is 55%.   The left atrium is moderately dilated.    Tele:  AF

## 2021-03-19 NOTE — PROGRESS NOTE ADULT - ASSESSMENT
Imp:  RLQ mass  Liver abscess by MRI, although this doesn't fit well clinically with presentation    Rec:  IR consult to discuss options  Team discussion regarding proceeding with surgery vs drainage/biopsy of liver lesion

## 2021-03-19 NOTE — PROGRESS NOTE ADULT - SUBJECTIVE AND OBJECTIVE BOX
Pt seen and examined at bedside, no acute events. Pt had no complaints. MRI was performed yesterday. Denied fever, chills, nausea, vomiting or SOB overnight.     Vital Signs Last 24 Hrs  T(C): 36.7 (19 Mar 2021 08:47), Max: 36.7 (18 Mar 2021 20:28)  T(F): 98 (19 Mar 2021 08:47), Max: 98.1 (18 Mar 2021 20:28)  HR: 87 (19 Mar 2021 08:47) (87 - 94)  BP: 114/68 (19 Mar 2021 08:47) (114/68 - 124/77)  BP(mean): 80 (19 Mar 2021 08:47) (80 - 80)  RR: 19 (19 Mar 2021 08:47) (19 - 19)  SpO2: 94% (19 Mar 2021 08:47) (94% - 97%)    Labs:                          13.1   5.15  )-----------( 348      ( 19 Mar 2021 08:41 )             40.9   03-19    140  |  110<H>  |  10  ----------------------------<  88  4.0   |  23  |  0.72    Ca    8.4<L>      19 Mar 2021 08:41  Mg     2.4     03-18    TPro  6.7  /  Alb  2.1<L>  /  TBili  0.6  /  DBili  x   /  AST  52<H>  /  ALT  45  /  AlkPhos  70  03-19              Meds:  aluminum hydroxide/magnesium hydroxide/simethicone Suspension 30 milliLiter(s) Oral every 4 hours PRN  metoprolol tartrate 25 milliGRAM(s) Oral two times a day  ondansetron Injectable 4 milliGRAM(s) IV Push every 6 hours PRN    Physical Exam:  General: AAOx3, in NAD  HEENT: NC/AT, EOMI  Cardio: irregularly irregular  Pulm: equal air entry b/l, non labored  Abdomen: soft, obese, NT/ND    Imaging:  < from: MR Abdomen w/wo IV Cont (03.18.21 @ 12:43) >    EXAM:  MR ABDOMEN WAW IC                            PROCEDURE DATE:  03/18/2021      < end of copied text >  < from: MR Abdomen w/wo IV Cont (03.18.21 @ 12:43) >    IMPRESSION:  *  Thick-walled, rim-enhancing lesion in the right lobe of the liver measures 4.3 x 4.1 cm. This may represent a collapsed cyst versus abscess.  *  Previously seen right paracolic gutter nodule on CT is nonenhancing and inconsistent with a peritoneal implant.    < end of copied text >  < from: CT Chest w/ IV Cont (03.18.21 @ 12:11) >  EXAM:  CT CHEST IC                            PROCEDURE DATE:  03/18/2021      < end of copied text >  < from: CT Chest w/ IV Cont (03.18.21 @ 12:11) >  IMPRESSION:  *  No evidence of metastatic disease to the chest.  *  Diffuse interstitial lung thickening may represent pulmonary edema.  *  Trace pleural and pericardial effusions.  *  Scattered patchy airspace opacities could be infectious versus edema.    < end of copied text >

## 2021-03-19 NOTE — PROGRESS NOTE ADULT - SUBJECTIVE AND OBJECTIVE BOX
71 y/o female with a PMHx of Crohns presents to the ED for evaluation. Pt reports having cough, n/v/d and feeling fatigued over the last few days. Pt fearful of being admitted and doesn't have a PCP; her  tells me he made her come, due to the fact that she was v weak, not eating and he was concerned.  Pt has new onset AF was RVR when she arrived and I believe this was the main reason her her feeling weak, she needs a CT abd to r/o IBD flare, doubt any ischemic colitis ( no blood with the diarrhea, but in view of the new AF worth ruling out). Case d/w ; pt hasn't been on any meds for years, doesn't have a GI I can call.  Pt tells me she stopped eating weeks ago.    3/16: Patient denies any complaints. Potassium is critically low and will need to be replenished prior to EGD with anesthesia.   03/17 Patient seen and examined at bedside before going to colonoscopy, was NPO due to colonoscopy   Was having diarrhea   03/18 Patient denies any pain, I answered all her questions about colonoscopy finding yesterday, aware  of a  mass , given support      REVIEW OF SYSTEMS:  All other review of systems is negative unless indicated above  Vital Signs Last 24 Hrs  T(C): 35.6 (19 Mar 2021 13:28), Max: 36.7 (18 Mar 2021 20:28)  T(F): 96 (19 Mar 2021 13:28), Max: 98.1 (18 Mar 2021 20:28)  HR: 107 (19 Mar 2021 13:28) (87 - 107)  BP: 107/70 (19 Mar 2021 13:28) (107/70 - 124/77)  BP(mean): 78 (19 Mar 2021 13:28) (78 - 80)  RR: 18 (19 Mar 2021 13:28) (18 - 19)  SpO2: 94% (19 Mar 2021 13:28) (94% - 97%)    PHYSICAL EXAM:  Constitutional: NAD, awake and alert, well-developed  HEENT: PERR, EOMI, Normal Hearing, MMM  Neck: Soft and supple, No LAD, No JVD  Respiratory: Breath sounds are clear bilaterally, No wheezing, rales or rhonchi  Cardiovascular: S1 and S2, regular rate and rhythm, no Murmurs, gallops or rubs  Gastrointestinal: Bowel Sounds present, soft, nontender, nondistended, no guarding, no rebound  Extremities: No peripheral edema  Vascular: 2+ peripheral pulses  Neurological: A/O x 3, no focal deficits  Musculoskeletal: 5/5 strength b/l upper and lower extremities  Skin: No rashes    03-19    140  |  110<H>  |  10  ----------------------------<  88  4.0   |  23  |  0.72    Ca    8.4<L>      19 Mar 2021 08:41  Mg     2.4     03-18    TPro  6.7  /  Alb  2.1<L>  /  TBili  0.6  /  DBili  x   /  AST  52<H>  /  ALT  45  /  AlkPhos  70  03-19                            13.1   5.15  )-----------( 348      ( 19 Mar 2021 08:41 )             40.9       LIVER FUNCTIONS - ( 19 Mar 2021 08:41 )  Alb: 2.1 g/dL / Pro: 6.7 gm/dL / ALK PHOS: 70 U/L / ALT: 45 U/L / AST: 52 U/L / GGT: x             CT Abdomen and Pelvis w/ IV Cont (03.15.21 @ 14:27) >  EXAM:  CT ABDOMEN AND PELVIS IC                        PROCEDURE DATE:  03/15/2021    INTERPRETATION:  CLINICAL INFORMATION: Crohn's with diarrhea  COMPARISON: None.  CONTRAST/COMPLICATIONS:  IV Contrast: Omnipaque 350 / / .  Oral Contrast: None  Complications: None    PROCEDURE:  CT of the Abdomen and Pelvis was performed.  Sagittal and coronal reformats were performed.    FINDINGS:  LOWER CHEST: Within normal limits.    LIVER: Hypovascular lesion in the medial right hepatic lobeextending into the caudate lobe indeterminate in nature however given possible cecal/appendiceal malignancy, metastasis cannot be excluded and MRI is recommended for further evaluation.  BILE DUCTS: Normal caliber.  GALLBLADDER: Peripheral calcification of the gallbladder wall particularly in the fundus consistent with a porcelain gallbladder.  SPLEEN: Subcentimeter hypodensity in the anterior spleen indeterminate.  PANCREAS: Within normal limits.  ADRENALS: Within normal limits.  KIDNEYS/URETERS: Kidneys enhance bilaterally and symmetrically without hydronephrosis renal mass or renal calculus. The ureters are unremarkable.  BLADDER: Within normal limits.  BOWEL, PERITONEUM AND REPRODUCTIVE ORGANS: Heterogeneous masslike lesion which appears to be involving the cecum/appendix and intimately associated with the right adnexa with adjacent infiltration of the fat concerning for a cecal versus appendiceal carcinoma with secondary involvement of the right adnexa versus right ovarian neoplasm. Scattered colonic diverticulosis. No bowel obstruction. Soft tissue nodular density within the right paracolic gutter fat, series 2 image 75, indeterminate in nature measuring 1 cm for which a peritoneal implant cannot be excluded.  VESSELS: Atherosclerotic changes.  RETROPERITONEUM/LYMPH NODES: No lymphadenopathy.  ABDOMINAL WALL: Within normal limits.  BONES: Degenerative changes.    IMPRESSION:  Soft tissue mass centered around the right adnexa appears to be emanating from the appendix/cecum and secondarily involving the right adnexa concerning for neoplasm which is likely appendiceal/cecal in origin versus right ovarian. Consideration for colonoscopy for further evaluation.  Indeterminate hepatic lesion for which MRI is recommendedfor further evaluation. Right paracolic gutter peritoneal nodularity for which carcinomatosis cannot be excluded.  ECHOCARDIOGRAM:  TTE Echo Complete w/o Contrast w/ Doppler (03.16.21 @ 10:34) >   Impression   Summary   Endocardium is not well visualized, however, overall left ventricular   systolic function appears normal. Estimated left ventricular ejection   fraction is 55%.   The left atrium is moderately dilated.    TELEMETRY:   12 Lead ECG (03.15.21 @ 12:36) >  Ventricular Rate 126 BPM  Atrial Rate 136 BPM  QRS Duration 74 ms  Q-T Interval 310 ms  QTC Calculation(Bazett) 448 ms  R Axis 48 degrees  T Axis 6 degrees  Diagnosis Line Atrial fibrillation with rapid ventricular response  ST & T wave abnormality, consider inferior ischemia  Abnormal ECG  When compared with ECG of 15-MAR-2021 12:35,  Nonspecific T wave abnormality has replaced inverted T waves in Lateral leads  MEDICATIONS  (STANDING):  metoprolol tartrate 25 milliGRAM(s) Oral every 8 hours  piperacillin/tazobactam IVPB.. 3.375 Gram(s) IV Intermittent every 8 hours    MEDICATIONS  (PRN):  aluminum hydroxide/magnesium hydroxide/simethicone Suspension 30 milliLiter(s) Oral every 4 hours PRN Dyspepsia  ondansetron Injectable 4 milliGRAM(s) IV Push every 6 hours PRN Nausea

## 2021-03-19 NOTE — BRIEF OPERATIVE NOTE - OPERATION/FINDINGS
s/p liver cyst vs abscess aspiration with 21G x 15cm Chiba, with aspiration of 8cc of pus and some serosanginous material, sent for gram stain, culture and cytology

## 2021-03-19 NOTE — PROGRESS NOTE ADULT - ASSESSMENT
71 y/o female with a PMHx of Crohns presents with cough, n/v/d and feeling fatigued over the last few days. New onset afib noted in ED. Patient has no PCP or recent medical care.    #Soft tissue mass centered around the right adnexa appears to be emanating from the appendix/cecum     Weakness, nausea, diarrhea,   abdominal discomfort not pain  -r/o Crohn's exacerbation vs ischemic colitis  -S/P zosyn x1   -IVF, rate at 80 in case there is CHF component  -CT abd PO contrast only:   Soft tissue mass centered around the right adnexa appears to be emanating from the appendix/cecum and secondarily involving the right adnexa concerning for neoplasm which is likely appendiceal/cecal in origin versus right ovarian.   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 Colonoscopy biopsy results pending.   Recommend:     Available for total hysterectomy and bilateral salpingooophorectomy if planning to take patient to OR  Will follow    # Liver mass vs Abcsess vs Cyst   Thick-walled, rim-enhancing lesion in the right lobe of the liver measures 4.3 x 4.1 cm. This may represent a collapsed cyst versus abscess.  IR Consulted Liver Biopsy done   liver cyst vs abscess aspiration with 21G x 15cm Chiba, with aspiration of 8cc of pus and some serosanguinous material, sent for gram stain, culture and cytolog  We will consult ID  c/w Zosyn   follow up cultures       # New onset AF, vs chronic undiagnosed   Patient doesn't have a PCP;  intermittent RVR  -suspect dehydration to contribute to it  -on Metoprolol increased to 25mg bd   -TTE- EF 55%. The left atrium is moderately dilated.  -cardio consulted;   HR better controlled   PIETRO VASC     #IHLARY, hyponatremia sec to dehydration  -s/p NS@80  -Resolved     #Diet,  #GOC:   - Code status not addressed pt is too anxious for me to start discussion at this time    DISPO: Pending further work up for possible neoplasm     Family Communication: Spouse Less Hernandez [phone] updated daily  69 y/o female with a PMHx of Crohns presents with cough, n/v/d and feeling fatigued over the last few days. New onset afib noted in ED. Patient has no PCP or recent medical care.    #Soft tissue mass centered around the right adnexa appears to be emanating from the appendix/cecum  Weakness, nausea, diarrhea,  abdominal discomfort not pain likely Appendix mass   -r/o Crohn's exacerbation vs ischemic colitis  -S/P zosyn x1   -IVF, rate at 80 in case there is CHF component  -CT abd PO contrast only:   Soft tissue mass centered around the right adnexa appears to be emanating from the appendix/cecum and secondarily involving the right adnexa concerning for neoplasm which is likely appendiceal/cecal in origin versus right ovarian.   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 Colonoscopy biopsy results pending.   Recommend:     Available for total hysterectomy and bilateral salpingooophorectomy if planning to take patient to OR  Will follow    #Sepsis likely present on admission Gram (+) Cocci Bacteremia  L Abcsess vs Cyst   Thick-walled, rim-enhancing lesion in the right lobe of the liver measures 4.3 x 4.1 cm. This may represent a collapsed cyst versus abscess.  IR Consulted Liver Biopsy done   liver cyst vs abscess aspiration with 21G x 15cm Chiba, with aspiration of 8cc of pus and some serosanguinous material, sent for gram stain, culture and cytolog  We will consult ID  Blood cx Anaerobic Bottle Gram + Cocci in pairs cultures in progress  c/w Zosyn   follow up cultures   repeat blood cultures       # New onset AF, vs chronic undiagnosed   Patient doesn't have a PCP;  intermittent RVR  -suspect dehydration to contribute to it  -on Metoprolol increased to 25mg bd   -TTE- EF 55%. The left atrium is moderately dilated.  -cardio consulted;   HR better controlled   PIETRO VASC     #HILARY, hyponatremia sec to dehydration  -s/p NS@80  -Resolved     #Diet,  #GOC:   - Code status not addressed pt is too anxious for me to start discussion at this time    DISPO: Pending further work up for possible neoplasm     Family Communication: Spouse Less Hernandez [phone] updated daily

## 2021-03-19 NOTE — PROGRESS NOTE ADULT - ASSESSMENT
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    Plan:  -f/u IR biopsy   -will f/u colonoscopy biopsy results  -medical management as per primary team  -CRS and gyn onc on board  -surg onc will follow    Plan discussed with Dr. Coulter

## 2021-03-19 NOTE — PROGRESS NOTE ADULT - SUBJECTIVE AND OBJECTIVE BOX
Patient is a 70y old  Female who presents with a chief complaint of nausea, diarrhea (18 Mar 2021 14:57)      Subective:  No complaints today    PAST MEDICAL & SURGICAL HISTORY:  Crohn&#x27;s disease        MEDICATIONS  (STANDING):  metoprolol tartrate 25 milliGRAM(s) Oral two times a day    MEDICATIONS  (PRN):  aluminum hydroxide/magnesium hydroxide/simethicone Suspension 30 milliLiter(s) Oral every 4 hours PRN Dyspepsia  ondansetron Injectable 4 milliGRAM(s) IV Push every 6 hours PRN Nausea      REVIEW OF SYSTEMS:    RESPIRATORY: No shortness of breath  CARDIOVASCULAR: No chest pain  All other review of systems is negative unless indicated above.    Vital Signs Last 24 Hrs  T(C): 36.7 (18 Mar 2021 20:28), Max: 36.7 (18 Mar 2021 20:28)  T(F): 98.1 (18 Mar 2021 20:28), Max: 98.1 (18 Mar 2021 20:28)  HR: 92 (18 Mar 2021 21:54) (92 - 94)  BP: 124/77 (18 Mar 2021 21:54) (116/77 - 124/77)  BP(mean): --  RR: --  SpO2: 97% (18 Mar 2021 20:28) (97% - 97%)    PHYSICAL EXAM:    Constitutional: NAD, well-developed  Respiratory: CTAB  Cardiovascular: S1 and S2, RRR  Gastrointestinal: BS+, soft, NT/ND  Extremities: No peripheral edema  Psychiatric: Normal mood, normal affect    LABS:                        12.9   5.89  )-----------( 393      ( 18 Mar 2021 14:01 )             40.1     03-18    141  |  111<H>  |  10  ----------------------------<  96  3.6   |  24  |  0.91    Ca    8.5      18 Mar 2021 14:01  Mg     2.4     03-18    TPro  6.9  /  Alb  2.2<L>  /  TBili  0.5  /  DBili  x   /  AST  60<H>  /  ALT  47  /  AlkPhos  76  03-18      LIVER FUNCTIONS - ( 18 Mar 2021 14:01 )  Alb: 2.2 g/dL / Pro: 6.9 gm/dL / ALK PHOS: 76 U/L / ALT: 47 U/L / AST: 60 U/L / GGT: x             RADIOLOGY & ADDITIONAL STUDIES:

## 2021-03-20 LAB
ALBUMIN SERPL ELPH-MCNC: 2.2 G/DL — LOW (ref 3.3–5)
ALP SERPL-CCNC: 73 U/L — SIGNIFICANT CHANGE UP (ref 40–120)
ALT FLD-CCNC: 42 U/L — SIGNIFICANT CHANGE UP (ref 12–78)
ANION GAP SERPL CALC-SCNC: 7 MMOL/L — SIGNIFICANT CHANGE UP (ref 5–17)
AST SERPL-CCNC: 40 U/L — HIGH (ref 15–37)
BASOPHILS # BLD AUTO: 0.03 K/UL — SIGNIFICANT CHANGE UP (ref 0–0.2)
BASOPHILS NFR BLD AUTO: 0.4 % — SIGNIFICANT CHANGE UP (ref 0–2)
BILIRUB SERPL-MCNC: 0.9 MG/DL — SIGNIFICANT CHANGE UP (ref 0.2–1.2)
BUN SERPL-MCNC: 11 MG/DL — SIGNIFICANT CHANGE UP (ref 7–23)
CALCIUM SERPL-MCNC: 8.6 MG/DL — SIGNIFICANT CHANGE UP (ref 8.5–10.1)
CHLORIDE SERPL-SCNC: 109 MMOL/L — HIGH (ref 96–108)
CO2 SERPL-SCNC: 24 MMOL/L — SIGNIFICANT CHANGE UP (ref 22–31)
CREAT SERPL-MCNC: 0.81 MG/DL — SIGNIFICANT CHANGE UP (ref 0.5–1.3)
CULTURE RESULTS: SIGNIFICANT CHANGE UP
EOSINOPHIL # BLD AUTO: 0.07 K/UL — SIGNIFICANT CHANGE UP (ref 0–0.5)
EOSINOPHIL NFR BLD AUTO: 1 % — SIGNIFICANT CHANGE UP (ref 0–6)
GLUCOSE SERPL-MCNC: 112 MG/DL — HIGH (ref 70–99)
HCT VFR BLD CALC: 39.5 % — SIGNIFICANT CHANGE UP (ref 34.5–45)
HGB BLD-MCNC: 12.9 G/DL — SIGNIFICANT CHANGE UP (ref 11.5–15.5)
IMM GRANULOCYTES NFR BLD AUTO: 0.6 % — SIGNIFICANT CHANGE UP (ref 0–1.5)
LYMPHOCYTES # BLD AUTO: 1.23 K/UL — SIGNIFICANT CHANGE UP (ref 1–3.3)
LYMPHOCYTES # BLD AUTO: 18.2 % — SIGNIFICANT CHANGE UP (ref 13–44)
MCHC RBC-ENTMCNC: 29.7 PG — SIGNIFICANT CHANGE UP (ref 27–34)
MCHC RBC-ENTMCNC: 32.7 GM/DL — SIGNIFICANT CHANGE UP (ref 32–36)
MCV RBC AUTO: 91 FL — SIGNIFICANT CHANGE UP (ref 80–100)
MONOCYTES # BLD AUTO: 0.59 K/UL — SIGNIFICANT CHANGE UP (ref 0–0.9)
MONOCYTES NFR BLD AUTO: 8.7 % — SIGNIFICANT CHANGE UP (ref 2–14)
NEUTROPHILS # BLD AUTO: 4.79 K/UL — SIGNIFICANT CHANGE UP (ref 1.8–7.4)
NEUTROPHILS NFR BLD AUTO: 71.1 % — SIGNIFICANT CHANGE UP (ref 43–77)
PLATELET # BLD AUTO: 419 K/UL — HIGH (ref 150–400)
POTASSIUM SERPL-MCNC: 3.6 MMOL/L — SIGNIFICANT CHANGE UP (ref 3.5–5.3)
POTASSIUM SERPL-SCNC: 3.6 MMOL/L — SIGNIFICANT CHANGE UP (ref 3.5–5.3)
PROCALCITONIN SERPL-MCNC: 0.2 NG/ML — HIGH (ref 0.02–0.1)
PROT SERPL-MCNC: 6.9 GM/DL — SIGNIFICANT CHANGE UP (ref 6–8.3)
RBC # BLD: 4.34 M/UL — SIGNIFICANT CHANGE UP (ref 3.8–5.2)
RBC # FLD: 13.7 % — SIGNIFICANT CHANGE UP (ref 10.3–14.5)
SODIUM SERPL-SCNC: 140 MMOL/L — SIGNIFICANT CHANGE UP (ref 135–145)
SPECIMEN SOURCE: SIGNIFICANT CHANGE UP
WBC # BLD: 6.75 K/UL — SIGNIFICANT CHANGE UP (ref 3.8–10.5)
WBC # FLD AUTO: 6.75 K/UL — SIGNIFICANT CHANGE UP (ref 3.8–10.5)

## 2021-03-20 PROCEDURE — 99233 SBSQ HOSP IP/OBS HIGH 50: CPT

## 2021-03-20 PROCEDURE — 99231 SBSQ HOSP IP/OBS SF/LOW 25: CPT | Mod: GC

## 2021-03-20 RX ORDER — DIGOXIN 250 MCG
250 TABLET ORAL DAILY
Refills: 0 | Status: DISCONTINUED | OUTPATIENT
Start: 2021-03-20 | End: 2021-03-23

## 2021-03-20 RX ORDER — CEFTRIAXONE 500 MG/1
2000 INJECTION, POWDER, FOR SOLUTION INTRAMUSCULAR; INTRAVENOUS EVERY 24 HOURS
Refills: 0 | Status: DISCONTINUED | OUTPATIENT
Start: 2021-03-20 | End: 2021-03-23

## 2021-03-20 RX ORDER — APIXABAN 2.5 MG/1
5 TABLET, FILM COATED ORAL EVERY 12 HOURS
Refills: 0 | Status: DISCONTINUED | OUTPATIENT
Start: 2021-03-20 | End: 2021-03-23

## 2021-03-20 RX ORDER — CEFTRIAXONE 500 MG/1
2000 INJECTION, POWDER, FOR SOLUTION INTRAMUSCULAR; INTRAVENOUS EVERY 24 HOURS
Refills: 0 | Status: DISCONTINUED | OUTPATIENT
Start: 2021-03-20 | End: 2021-03-20

## 2021-03-20 RX ADMIN — CEFTRIAXONE 2000 MILLIGRAM(S): 500 INJECTION, POWDER, FOR SOLUTION INTRAMUSCULAR; INTRAVENOUS at 23:05

## 2021-03-20 RX ADMIN — PIPERACILLIN AND TAZOBACTAM 25 GRAM(S): 4; .5 INJECTION, POWDER, LYOPHILIZED, FOR SOLUTION INTRAVENOUS at 15:38

## 2021-03-20 RX ADMIN — Medication 250 MICROGRAM(S): at 11:26

## 2021-03-20 RX ADMIN — Medication 25 MILLIGRAM(S): at 05:37

## 2021-03-20 RX ADMIN — Medication 25 MILLIGRAM(S): at 15:38

## 2021-03-20 RX ADMIN — PIPERACILLIN AND TAZOBACTAM 25 GRAM(S): 4; .5 INJECTION, POWDER, LYOPHILIZED, FOR SOLUTION INTRAVENOUS at 05:37

## 2021-03-20 RX ADMIN — Medication 25 MILLIGRAM(S): at 23:05

## 2021-03-20 RX ADMIN — APIXABAN 5 MILLIGRAM(S): 2.5 TABLET, FILM COATED ORAL at 23:06

## 2021-03-20 NOTE — PROGRESS NOTE ADULT - SUBJECTIVE AND OBJECTIVE BOX
REASON FOR VISIT:  AF    HPI:  71 y/o woman with a history of Crohns Disease and lack of regular medical attention admitted on 3/15/21 with new onset AF with possible Crohns' exacerbation vs ischemic colitis and with abdominal CT revealing a soft tissue mass around the right adnexa.    3/16/'21: no complaints. discussed CT scan findings. colonoscopy rescheduled tomorrow K low today. discussed CV pt reluctant to do this.  3/17/21:  No new complaints; occasional palpitations.  3/18/21:  Upset about appendiceal tumor; no cardiac symptoms.  3/19/21:  No new complaints; awaiting plan from surgical teams  3/20/21: Frustrated with illness but feels well - no palpitations, no abdominal pain.      MEDICATIONS  (STANDING):  metoprolol tartrate 25 milliGRAM(s) Oral every 8 hours  piperacillin/tazobactam IVPB.. 3.375 Gram(s) IV Intermittent every 8 hours    MEDICATIONS  (PRN):  aluminum hydroxide/magnesium hydroxide/simethicone Suspension 30 milliLiter(s) Oral every 4 hours PRN Dyspepsia  ondansetron Injectable 4 milliGRAM(s) IV Push every 6 hours PRN Nausea    Vital Signs Last 24 Hrs  T(C): 36.8 (20 Mar 2021 08:39), Max: 36.8 (19 Mar 2021 21:03)  T(F): 98.3 (20 Mar 2021 08:39), Max: 98.3 (20 Mar 2021 08:39)  HR: 92 (20 Mar 2021 08:39) (92 - 115)  BP: 109/75 (20 Mar 2021 08:39) (103/69 - 109/75)  BP(mean): 78 (19 Mar 2021 13:28) (78 - 78)  RR: 19 (20 Mar 2021 08:39) (18 - 19)  SpO2: 95% (20 Mar 2021 08:39) (94% - 95%)    PHYSICAL EXAM:  Constitutional: NAD, awake and alert, obese  Respiratory: Breath sounds are clear bilaterally  Cardiovascular: Irregular, normal rate  Gastrointestinal: Bowel Sounds present, abdomen is soft, nontender.   Extremities: No edema.    LABS:                             13.1   5.15  )-----------( 348      ( 19 Mar 2021 08:41 )             40.9     140  |  110<H>  |  10  ----------------------------<  88  4.0   |  23  |  0.72    Ca    8.4<L>      19 Mar 2021 08:41  Mg     2.4     03-18    TPro  6.7  /  Alb  2.1<L>  /  TBili  0.6  /  DBili  x   /  AST  52<H>  /  ALT  45  /  AlkPhos  70  03-19    12 Lead ECG (03.15.21 @ 12:36):  Atrial fibrillation with rapid ventricular response  ST & T wave abnormality, consider inferior ischemia    CT Abdomen and Pelvis w/ IV Cont (03.15.21 @ 14:27):  Soft tissue mass centered around the right adnexa appears to be emanating from the appendix/cecum and secondarily involving the right adnexa concerning for neoplasm which is likely appendiceal/cecal in origin versus right ovarian. Consideration for colonoscopy for further evaluation. Indeterminate hepatic lesion for which MRI is recommended for further evaluation. Right paracolic gutter peritoneal nodularity for which carcinomatosis cannot be excluded.    TTE Echo Complete w/o Contrast w/ Doppler (03.16.21 @ 10:34):   Endocardium is not well visualized, however, overall left ventricular systolic function appears normal. Estimated left ventricular ejection fraction is 55%.   The left atrium is moderately dilated.    Tele:  AF

## 2021-03-20 NOTE — CONSULT NOTE ADULT - REASON FOR ADMISSION
nausea, diarrhea

## 2021-03-20 NOTE — CONSULT NOTE ADULT - CONSULT REQUESTED DATE/TIME
18-Mar-2021 14:57
20-Mar-2021 17:42
17-Mar-2021 18:43
18-Mar-2021 10:26
15-Mar-2021 17:44
15-Mar-2021 17:11

## 2021-03-20 NOTE — CONSULT NOTE ADULT - ASSESSMENT
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    Plan:  -f/u MRI to evaluate liver lesion and peritoneal nodule seen on CT  -pt may need liver bx depending on MRI results  -will f/u colonoscopy biopsy results  -CT chest for staging  -gyn onc eval  -medical management as per primary team  -surgery will follow    Plan discussed with Dr. Coulter
71 y/o female with a PMHx of Crohns admitted on 3/15 for evaluation of fatigue, nausea, vomiting diarrhea and not eating; upon admission was in rapid afib; workup reveals Strep anginosus in blood and imaging reveals inflamed appendix and liver abscess. The patient will need interval iv antibiotics and then eventual appendectomy. She notes discomfort at the site of the IR procedure for the liver abscess otherwise has improved since admission and is afebrile, no specific complaints.   1. Patient admitted with Strep anginosus in blood, liver abscess and probable lesion at the cecum/ appendix; most liver abscesses originate from the appendix and are Strep species; hopefully this will be the case and not a neoplasm  - the plan would be to treat the patient with six weeks of iv antibiotics, will optimize to ceftriaxone 2 grams daily  - need to repeat blood cultures prior to placing a picc line  - no evidence of endocarditis seen on echocardiogram  - will need case management help given patient insurance issues: only has medicare  - surgical evaluation in progress  - serial cbc and monitor temperature   - reviewed prior medical records to evaluate for resistant or atypical pathogens   - iv hydration and supportive care   2. other issues: per medicine  
Imp:  Fatigue in setting fo AF  CT with concern for RLQ mass, probably cecal or appendiceal or adnexal  Concern for possible liver met, peritoneal met    Rec;  Colonoscopy tomorrow. Risks and benefits reviewed, all ?answered  Check CEA and 
71yo  LMP age 50 w/ PMHx significant for Crohn's disease admitted due to N/V/D, fatigue, and atrial fibrillation. Patient noted to have appendiceal/cecal vs. right ovarian mass and possible liver metastasis. Possible carcinomatosis on CT. ECOG 0.   MRI abdomen pending.  slightly elevated. Colonoscopy biopsy results pending.     Recommend:     Available for total hysterectomy and bilateral salpingoophorectomy if planning to take patient to OR  Will follow    d/w Dr. Lujan
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    Plan:  -f/u MRI to evaluate liver lesion and  peritoneal nodule seen on CT  -pt may need liver bx depending on MRI results  -will f/u colonoscopy biopsy results  -medical management as per primary team  -colorectal surgery will follow    Plan discussed with Dr. Jacobo

## 2021-03-20 NOTE — CONSULT NOTE ADULT - SUBJECTIVE AND OBJECTIVE BOX
Patient is a 70y old  Female who presents with a chief complaint of nausea, diarrhea (20 Mar 2021 13:23)    HPI:  71 y/o female with a PMHx of Crohns admitted on 3/15 for evaluation of fatigue, nausea, vomiting diarrhea and not eating; upon admission was in rapid afib; workup reveals Strep anginosus in blood and imaging reveals inflamed appendix and liver abscess. The patient will need interval iv antibiotics and then eventual appendectomy. She notes discomfort at the site of the IR procedure for the liver abscess otherwise has improved since admission and is afebrile, no specific complaints.         PMH: as above  PSH: as above  Meds: per reconciliation sheet, noted below  MEDICATIONS  (STANDING):  apixaban 5 milliGRAM(s) Oral every 12 hours  digoxin     Tablet 250 MICROGram(s) Oral daily  metoprolol tartrate 25 milliGRAM(s) Oral every 8 hours  piperacillin/tazobactam IVPB.. 3.375 Gram(s) IV Intermittent every 8 hours    MEDICATIONS  (PRN):  aluminum hydroxide/magnesium hydroxide/simethicone Suspension 30 milliLiter(s) Oral every 4 hours PRN Dyspepsia  ondansetron Injectable 4 milliGRAM(s) IV Push every 6 hours PRN Nausea    Allergies    No Known Allergies    Intolerances      Social: no smoking, no alcohol, no illegal drugs; no recent travel, no exposure to TB  FAMILY HISTORY: unable to obtain secondary to patient medical condition      no history of premature cardiovascular disease in first degree relatives  ROS: the patient denies fever, no chills, no HA, no dizziness, no sore throat, no blurry vision, no CP, no palpitations, no SOB, no cough, no abdominal pain, no diarrhea, no N/V, no dysuria, no leg pain, no claudication, no rash, no joint aches, no rectal pain or bleeding, no night sweats  All other systems reviewed and are negative    Vital Signs Last 24 Hrs  T(C): 36.8 (20 Mar 2021 08:39), Max: 36.8 (19 Mar 2021 21:03)  T(F): 98.3 (20 Mar 2021 08:39), Max: 98.3 (20 Mar 2021 08:39)  HR: 112 (20 Mar 2021 15:37) (92 - 115)  BP: 107/52 (20 Mar 2021 15:37) (103/69 - 109/75)  BP(mean): --  RR: 19 (20 Mar 2021 08:39) (18 - 19)  SpO2: 95% (20 Mar 2021 08:39) (94% - 95%)  Daily     Daily     PE:    Constitutional: frail looking  HEENT: NC/AT, EOMI, PERRLA, conjunctivae clear; ears and nose atraumatic; pharynx clear  Neck: supple; thyroid not palpable  Back: no tenderness  Respiratory: respiratory effort normal; clear to auscultation  Cardiovascular: S1S2 regular, no murmurs  Abdomen: soft, not tender, not distended, positive BS; no liver or spleen organomegaly  Genitourinary: no suprapubic tenderness  Musculoskeletal: no muscle tenderness, no joint swelling or tenderness  Neurological/ Psychiatric: AxOx3, judgement and insight normal;  moving all extremities  Skin: no rashes; no palpable lesions    Labs: all available labs reviewed                        12.9   6.75  )-----------( 419      ( 20 Mar 2021 08:41 )             39.5     03-20    140  |  109<H>  |  11  ----------------------------<  112<H>  3.6   |  24  |  0.81    Ca    8.6      20 Mar 2021 08:41    TPro  6.9  /  Alb  2.2<L>  /  TBili  0.9  /  DBili  x   /  AST  40<H>  /  ALT  42  /  AlkPhos  73  03-20     LIVER FUNCTIONS - ( 20 Mar 2021 08:41 )  Alb: 2.2 g/dL / Pro: 6.9 gm/dL / ALK PHOS: 73 U/L / ALT: 42 U/L / AST: 40 U/L / GGT: x           Culture - Abscess with Gram Stain (03.19.21 @ 12:30)    Specimen Source: .Abscess liver    Culture Results:   No growth    Culture - Blood (03.15.21 @ 12:48)    -  Streptococcus anginosus group: Detec    Gram Stain:   Growth in anaerobic bottle: Gram positive cocci in pairs    Specimen Source: .Blood Blood-Peripheral    Organism: Blood Culture PCR    Culture Results:   Growth in anaerobic bottle: Gram positive cocci in pairs  ***Blood Panel PCR results on this specimen are available  approximately 3 hours after the Gram stain result.***  Gram stain, PCR, and/or culture results may not always  correspond due to difference in methodologies.  ************************************************************  This PCR assay was performed by multiplex PCR. This  Assay tests for 66 bacterial and resistance gene targets.  Please refer to the [facility] test directory  at [email or web address] for details.    Organism Identification: Blood Culture PCR    Method Type: PCR      < from: MR Abdomen w/wo IV Cont (03.18.21 @ 12:43) >    IMPRESSION:  *  Thick-walled, rim-enhancing lesion in the right lobe of the liver measures 4.3 x 4.1 cm. This may represent a collapsed cyst versus abscess.  *  Previously seen right paracolic gutter nodule on CT is nonenhancing and inconsistent with a peritoneal implant.      < end of copied text >        < from: CT Abdomen and Pelvis w/ IV Cont (03.15.21 @ 14:27) >  IMPRESSION:  Soft tissue mass centered around the right adnexa appears to be emanating from the appendix/cecum and secondarily involving the right adnexa concerning for neoplasm which is likely appendiceal/cecal in origin versus right ovarian. Consideration for colonoscopy for further evaluation.    Indeterminate hepatic lesion for which MRI is recommendedfor further evaluation. Right paracolic gutter peritoneal nodularity for which carcinomatosis cannot be excluded.      < end of copied text >                < from: TTE Echo Complete w/o Contrast w/ Doppler (03.16.21 @ 10:34) >     Impression     Summary     Endocardium is not well visualized, however, overall left ventricular   systolic function appears normal. Estimated left ventricular ejection   fraction is 55%.   The left atrium is moderately dilated.      < end of copied text >              Radiology: all available radiological tests reviewed    Advanced directives addressed: full resuscitation

## 2021-03-20 NOTE — PROGRESS NOTE ADULT - SUBJECTIVE AND OBJECTIVE BOX
69 y/o female with a PMHx of Crohns presents to the ED for evaluation. Pt reports having cough, n/v/d and feeling fatigued over the last few days. Pt fearful of being admitted and doesn't have a PCP; her  tells me he made her come, due to the fact that she was v weak, not eating and he was concerned.  Pt has new onset AF was RVR when she arrived and I believe this was the main reason her her feeling weak, she needs a CT abd to r/o IBD flare, doubt any ischemic colitis ( no blood with the diarrhea, but in view of the new AF worth ruling out). Case d/w ; pt hasn't been on any meds for years, doesn't have a GI I can call.  Pt tells me she stopped eating weeks ago.    3/16: Patient denies any complaints. Potassium is critically low and will need to be replenished prior to EGD with anesthesia.   03/17 Patient seen and examined at bedside before going to colonoscopy, was NPO due to colonoscopy   Was having diarrhea   03/18 Patient denies any pain, I answered all her questions about colonoscopy finding yesterday, aware  of a  mass , given support  3/19 - anxious to go home, states she feels great, denied any palpitations despite afib with RVR       PHYSICAL EXAM:  Vital Signs Last 24 Hrs  T(F): 98.3 (20 Mar 2021 08:39), Max: 98.3 (20 Mar 2021 08:39)  HR: 92 (20 Mar 2021 08:39) (92 - 115)  BP: 109/75 (20 Mar 2021 08:39) (103/69 - 109/75)  BP(mean): 78 (19 Mar 2021 13:28) (78 - 78)  RR: 19 (20 Mar 2021 08:39) (18 - 19)  SpO2: 95% (20 Mar 2021 08:39) (94% - 95%)  Constitutional: NAD, awake and alert, well-developed  HEENT: PERR, EOMI, Normal Hearing, MMM  Neck: Soft and supple, No LAD, No JVD  Respiratory: Breath sounds are clear bilaterally, No wheezing, rales or rhonchi  Cardiovascular: S1 and S2, regular rate and rhythm, no Murmurs, gallops or rubs  Gastrointestinal: Bowel Sounds present, soft, nontender, nondistended, no guarding, no rebound  Extremities: No peripheral edema  Vascular: 2+ peripheral pulses  Neurological: A/O x 3, no focal deficits  Musculoskeletal: 5/5 strength b/l upper and lower extremities  Skin: No rashes        RAD   CT Abdomen and Pelvis w/ IV Cont (03.15.21 @ 14:27) >  Soft tissue mass centered around the right adnexa appears to be emanating from the appendix/cecum and secondarily involving the right adnexa concerning for neoplasm which is likely appendiceal/cecal in origin versus right ovarian. Consideration for colonoscopy for further evaluation.  Indeterminate hepatic lesion for which MRI is recommended for further evaluation. Right paracolic gutter peritoneal nodularity for which carcinomatosis cannot be excluded.    ECHOCARDIOGRAM:  TTE Echo Complete w/o Contrast w/ Doppler (03.16.21 @ 10:34) >   Impression   Summary   Endocardium is not well visualized, however, overall left ventricular   systolic function appears normal. Estimated left ventricular ejection   fraction is 55%.   The left atrium is moderately dilated.    Diagnosis Line Atrial fibrillation with rapid ventricular response    LABS: All Labs Reviewed:                      12.9   6.75  )-----------( 419      ( 20 Mar 2021 08:41 )             39.5    140  |  109<H>  |  11  ----------------------------<  112<H>  3.6   |  24  |  0.81    Ca    8.6      20 Mar 2021 08:41  Mg     2.4     03-18    TPro  6.9  /  Alb  2.2<L>  /  TBili  0.9  /  DBili  x   /  AST  40<H>  /  ALT  42  /  AlkPhos  73  03-20        MEDS:   aluminum hydroxide/magnesium hydroxide/simethicone Suspension 30 milliLiter(s) Oral every 4 hours PRN  apixaban 5 milliGRAM(s) Oral every 12 hours  digoxin     Tablet 250 MICROGram(s) Oral daily  metoprolol tartrate 25 milliGRAM(s) Oral every 8 hours  ondansetron Injectable 4 milliGRAM(s) IV Push every 6 hours PRN  piperacillin/tazobactam IVPB.. 3.375 Gram(s) IV Intermittent every 8 hours

## 2021-03-20 NOTE — PROGRESS NOTE ADULT - ASSESSMENT
71 y/o female with a PMHx of Crohns presents with cough, n/v/d and feeling fatigued over the last few days. New onset afib noted in ED. Patient has no PCP or recent medical care.    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 Colonoscopy biopsy results pending.   Gyn onc eval  3/20 - discussed with Dr Ervin - appendix, contained perforation, liver abscess - drained, cultres pending  currently on pip tazo. ID consult for duration of treatment.     #Sepsis likely present on admission Gram (+) Cocci Bacteremia  L Abcsess vs Cyst   Blood cx Anaerobic Bottle Gram + Cocci in pairs cultures in progress  Thick-walled, rim-enhancing lesion in the right lobe of the liver measures 4.3 x 4.1 cm. This may represent a collapsed cyst versus abscess.  IR Consulted Liver Biopsy done   liver cyst vs abscess aspiration with 21G x 15cm Chiba, with aspiration of 8cc of pus and some serosanguinous material, sent for gram stain, culture and cytology; f/u cultures liver abscess as well as rpt blood cxs           # New onset AF, vs chronic undiagnosed   Patient doesn't have a PCP;  intermittent RVR  -suspect dehydration to contribute to it  -on Metoprolol increased to 25mg bd   -TTE- EF 55%. The left atrium is moderately dilated.  3/20 - AFIB WITH RVR TODAY   on DIGOXIN per Card  PIETRO VASC 2 for age and gender - rec AC with ELIQUIS,  will call Card; will start tonight if okay with Surgery     #HILARY, hyponatremia sec to dehydration  -s/p NS@80, resolved       #GOC:  Pt anxious and just wants to go home   #NOK:  Mr Ng [phone]  discussed with RN and Surgery

## 2021-03-20 NOTE — PROGRESS NOTE ADULT - ASSESSMENT
69 yo F with appendiceal mass seen on CT involving right adnexa and part of cecum, on colonoscopy, appendix biopsied, noted terminal ileum and cecum without mass.  Pt likely had perforated appendicitis with phlegmon to the liver and right adnexa.    Colonoscopy biopsy results: negative for malignancy, benign colonic mucosa with acute inflammation, ulceration, granulation tissue and reactive changes  IR cultures: pending    Plan:  -f/u IR cultures  -c/w IV abx  -once cultures are back, recc ID consult for outpatient IV abx and PICC placement  -pt will benefit from interval appendectomy vs ileocecectomy   -medical management as per primary team  -colorectal surgery will follow    Plan discussed with Dr. Jacobo

## 2021-03-20 NOTE — PROGRESS NOTE ADULT - SUBJECTIVE AND OBJECTIVE BOX
Pt seen and examined at bedside, no acute events. Pt had no complaints. IR procedure performed yesterday revealing pus drainage from hepatic lesion. Denied fever, chills, nausea, vomiting or SOB overnight.     Vital Signs Last 24 Hrs  T(C): 36.8 (19 Mar 2021 21:03), Max: 36.8 (19 Mar 2021 21:03)  T(F): 98.2 (19 Mar 2021 21:03), Max: 98.2 (19 Mar 2021 21:03)  HR: 115 (19 Mar 2021 21:03) (87 - 115)  BP: 103/69 (19 Mar 2021 21:03) (103/69 - 114/68)  BP(mean): 78 (19 Mar 2021 13:28) (78 - 80)  RR: 18 (19 Mar 2021 21:03) (18 - 19)  SpO2: 94% (19 Mar 2021 21:03) (94% - 94%)    Labs:                                     13.1   5.15  )-----------( 348      ( 19 Mar 2021 08:41 )             40.9   03-19    140  |  110<H>  |  10  ----------------------------<  88  4.0   |  23  |  0.72    Ca    8.4<L>      19 Mar 2021 08:41  Mg     2.4     03-18    TPro  6.7  /  Alb  2.1<L>  /  TBili  0.6  /  DBili  x   /  AST  52<H>  /  ALT  45  /  AlkPhos  70  03-19                Meds:  aluminum hydroxide/magnesium hydroxide/simethicone Suspension 30 milliLiter(s) Oral every 4 hours PRN  metoprolol tartrate 25 milliGRAM(s) Oral two times a day  ondansetron Injectable 4 milliGRAM(s) IV Push every 6 hours PRN    Physical Exam:  General: AAOx3, in NAD  HEENT: NC/AT, EOMI  Cardio: irregularly irregular  Pulm: equal air entry b/l, non labored  Abdomen: soft, obese, NT/ND

## 2021-03-20 NOTE — CONSULT NOTE ADULT - CONSULT REASON
Abdominal mass
appendiceal vs. ovarian mass
liver abscess
afib newly diagnosed.
appendiceal tumor
hepatic lesion

## 2021-03-21 LAB
ANION GAP SERPL CALC-SCNC: 8 MMOL/L — SIGNIFICANT CHANGE UP (ref 5–17)
BUN SERPL-MCNC: 11 MG/DL — SIGNIFICANT CHANGE UP (ref 7–23)
CALCIUM SERPL-MCNC: 8.9 MG/DL — SIGNIFICANT CHANGE UP (ref 8.5–10.1)
CHLORIDE SERPL-SCNC: 108 MMOL/L — SIGNIFICANT CHANGE UP (ref 96–108)
CO2 SERPL-SCNC: 24 MMOL/L — SIGNIFICANT CHANGE UP (ref 22–31)
CREAT SERPL-MCNC: 0.85 MG/DL — SIGNIFICANT CHANGE UP (ref 0.5–1.3)
GLUCOSE SERPL-MCNC: 102 MG/DL — HIGH (ref 70–99)
HCT VFR BLD CALC: 41.3 % — SIGNIFICANT CHANGE UP (ref 34.5–45)
HGB BLD-MCNC: 13.7 G/DL — SIGNIFICANT CHANGE UP (ref 11.5–15.5)
MAGNESIUM SERPL-MCNC: 2.2 MG/DL — SIGNIFICANT CHANGE UP (ref 1.6–2.6)
MCHC RBC-ENTMCNC: 30.3 PG — SIGNIFICANT CHANGE UP (ref 27–34)
MCHC RBC-ENTMCNC: 33.2 GM/DL — SIGNIFICANT CHANGE UP (ref 32–36)
MCV RBC AUTO: 91.4 FL — SIGNIFICANT CHANGE UP (ref 80–100)
PLATELET # BLD AUTO: 453 K/UL — HIGH (ref 150–400)
POTASSIUM SERPL-MCNC: 3.6 MMOL/L — SIGNIFICANT CHANGE UP (ref 3.5–5.3)
POTASSIUM SERPL-SCNC: 3.6 MMOL/L — SIGNIFICANT CHANGE UP (ref 3.5–5.3)
RBC # BLD: 4.52 M/UL — SIGNIFICANT CHANGE UP (ref 3.8–5.2)
RBC # FLD: 13.8 % — SIGNIFICANT CHANGE UP (ref 10.3–14.5)
SODIUM SERPL-SCNC: 140 MMOL/L — SIGNIFICANT CHANGE UP (ref 135–145)
WBC # BLD: 6.28 K/UL — SIGNIFICANT CHANGE UP (ref 3.8–10.5)
WBC # FLD AUTO: 6.28 K/UL — SIGNIFICANT CHANGE UP (ref 3.8–10.5)

## 2021-03-21 PROCEDURE — 99232 SBSQ HOSP IP/OBS MODERATE 35: CPT

## 2021-03-21 PROCEDURE — 99231 SBSQ HOSP IP/OBS SF/LOW 25: CPT

## 2021-03-21 PROCEDURE — 99233 SBSQ HOSP IP/OBS HIGH 50: CPT

## 2021-03-21 RX ADMIN — Medication 250 MICROGRAM(S): at 10:17

## 2021-03-21 RX ADMIN — Medication 25 MILLIGRAM(S): at 06:16

## 2021-03-21 RX ADMIN — APIXABAN 5 MILLIGRAM(S): 2.5 TABLET, FILM COATED ORAL at 10:17

## 2021-03-21 RX ADMIN — Medication 25 MILLIGRAM(S): at 13:33

## 2021-03-21 RX ADMIN — APIXABAN 5 MILLIGRAM(S): 2.5 TABLET, FILM COATED ORAL at 21:35

## 2021-03-21 RX ADMIN — CEFTRIAXONE 2000 MILLIGRAM(S): 500 INJECTION, POWDER, FOR SOLUTION INTRAMUSCULAR; INTRAVENOUS at 21:36

## 2021-03-21 NOTE — PROGRESS NOTE ADULT - ASSESSMENT
71 y/o female with a PMHx of Crohns presents with cough, n/v/d and feeling fatigued over the last few days. New onset afib noted in ED. Patient has no PCP or recent medical care.    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 Colonoscopy biopsy results pending.   Gyn onc eval  3/20 - discussed with Dr Ervin - appendix, contained perforation, liver abscess - drained, cultres pending  currently on pip tazo. ID consult for duration of treatment.   3/21 - plan for 6 weeks of ABx per ID    #Sepsis likely present on admission Gram (+) Cocci Bacteremia  L Abcsess vs Cyst   Blood cx Anaerobic Bottle Gram + Cocci in pairs cultures in progress  Thick-walled, rim-enhancing lesion in the right lobe of the liver measures 4.3 x 4.1 cm. This may represent a collapsed cyst versus abscess.  IR Consulted Liver Biopsy done   liver cyst vs abscess aspiration with 21G x 15cm Chiba, with aspiration of 8cc of pus and some serosanguinous material, sent for gram stain, culture and cytology; f/u cultures liver abscess as well as rpt blood cxs           # New onset AF, vs chronic undiagnosed   Patient doesn't have a PCP;  intermittent RVR  -suspect dehydration to contribute to it  -on Metoprolol increased to 25mg bd   -TTE- EF 55%. The left atrium is moderately dilated.  3/20 - AFIB WITH RVR TODAY   on DIGOXIN per Card  PIETRO VASC 2 for age and gender - rec AC with ELIQUIS,  will call Card; will start tonight if okay with Surgery   3/21 - on eliquis, pt counseled re the risks benefits of AC use and precautions to take while on it  reduce dose of eliquis on discharge     #HILARY, hyponatremia sec to dehydration  -s/p NS@80, resolved       #GOC:  Pt anxious and just wants to go home   #NOK:  Mr Ng [phone]  discussed with RN and Surgery  Card and Mr Hernandez WATTS/TUES

## 2021-03-21 NOTE — PROGRESS NOTE ADULT - ASSESSMENT
69 yo F with appendiceal mass seen on CT involving right adnexa and part of cecum, on colonoscopy, appendix biopsied, noted terminal ileum and cecum without mass.  Pt likely had perforated appendicitis with phlegmon to the liver and right adnexa.    Colonoscopy biopsy results: negative for malignancy, benign colonic mucosa with acute inflammation, ulceration, granulation tissue and reactive changes  IR cultures: no growth    Plan:  -f/u IR cultures gram stain?  -c/w IV abx  -ID consult appreciated  -repeat blood cultures  -needs IV abx and PICC placement once blood cultures neg  -pt will benefit from interval appendectomy vs ileocecectomy   -medical management as per primary team  -colorectal surgery will follow    Plan discussed with Dr. Ervin

## 2021-03-21 NOTE — PROGRESS NOTE ADULT - SUBJECTIVE AND OBJECTIVE BOX
REASON FOR VISIT:  AF    HPI:  69 y/o woman with a history of Crohns Disease and lack of regular medical attention admitted on 3/15/21 with new onset AF with possible Crohns' exacerbation vs ischemic colitis and with abdominal CT revealing a soft tissue mass around the right adnexa.    3/16/'21: no complaints. discussed CT scan findings. colonoscopy rescheduled tomorrow K low today. discussed CV pt reluctant to do this.  3/17/21:  No new complaints; occasional palpitations.  3/18/21:  Upset about appendiceal tumor; no cardiac symptoms.  3/19/21:  No new complaints; awaiting plan from surgical teams  3/20/21: Frustrated with illness but feels well - no palpitations, no abdominal pain.    3/21/21:  Eager to return home; feels ok; ambulated in hallway yesterday    MEDICATIONS  (STANDING):  apixaban 5 milliGRAM(s) Oral every 12 hours  cefTRIAXone Injectable. 2000 milliGRAM(s) IV Push every 24 hours  digoxin     Tablet 250 MICROGram(s) Oral daily  metoprolol tartrate 25 milliGRAM(s) Oral every 8 hours    MEDICATIONS  (PRN):  aluminum hydroxide/magnesium hydroxide/simethicone Suspension 30 milliLiter(s) Oral every 4 hours PRN Dyspepsia  ondansetron Injectable 4 milliGRAM(s) IV Push every 6 hours PRN Nausea    Vital Signs Last 24 Hrs  T(C): 36.7 (20 Mar 2021 20:43), Max: 36.8 (20 Mar 2021 08:39)  T(F): 98 (20 Mar 2021 20:43), Max: 98.3 (20 Mar 2021 08:39)  HR: 102 (20 Mar 2021 20:43) (92 - 112)  BP: 110/68 (20 Mar 2021 20:43) (107/52 - 110/68)  RR: 18 (20 Mar 2021 20:43) (18 - 19)  SpO2: 93% (20 Mar 2021 20:43) (93% - 95%)    PHYSICAL EXAM:  Constitutional: NAD, awake and alert, obese  Respiratory: Breath sounds are clear bilaterally  Cardiovascular: Irregular, no murmur  Gastrointestinal: Bowel Sounds present, abdomen is soft, nontender.   psych: Appropriate mood and affect    LABS:                             12.9   6.75  )-----------( 419      ( 20 Mar 2021 08:41 )             39.5     140  |  109<H>  |  11  ----------------------------<  112<H>  3.6   |  24  |  0.81    Ca    8.6      20 Mar 2021 08:41    TPro  6.9  /  Alb  2.2<L>  /  TBili  0.9  /  DBili  x   /  AST  40<H>  /  ALT  42  /  AlkPhos  73  03-20    12 Lead ECG (03.15.21 @ 12:36):  Atrial fibrillation with rapid ventricular response  ST & T wave abnormality, consider inferior ischemia    CT Abdomen and Pelvis w/ IV Cont (03.15.21 @ 14:27):  Soft tissue mass centered around the right adnexa appears to be emanating from the appendix/cecum and secondarily involving the right adnexa concerning for neoplasm which is likely appendiceal/cecal in origin versus right ovarian. Consideration for colonoscopy for further evaluation. Indeterminate hepatic lesion for which MRI is recommended for further evaluation. Right paracolic gutter peritoneal nodularity for which carcinomatosis cannot be excluded.    TTE Echo Complete w/o Contrast w/ Doppler (03.16.21 @ 10:34):   Endocardium is not well visualized, however, overall left ventricular systolic function appears normal. Estimated left ventricular ejection fraction is 55%.   The left atrium is moderately dilated.    Tele:  AF

## 2021-03-21 NOTE — PROGRESS NOTE ADULT - ASSESSMENT
71 y/o female with a PMHx of Crohns admitted on 3/15 for evaluation of fatigue, nausea, vomiting diarrhea and not eating; upon admission was in rapid afib; workup reveals Strep anginosus in blood and imaging reveals inflamed appendix and liver abscess. The patient will need interval iv antibiotics and then eventual appendectomy. She notes discomfort at the site of the IR procedure for the liver abscess otherwise has improved since admission and is afebrile, no specific complaints.   1. Patient admitted with Strep anginosus in blood, liver abscess and probable lesion at the cecum/ appendix; most liver abscesses originate from the appendix and are Strep species; hopefully this will be the case and not a neoplasm  - the plan would be to treat the patient with six weeks of iv antibiotics, will optimize to ceftriaxone 2 grams daily until 5/1  - need to repeat blood cultures prior to placing a picc line  - no evidence of endocarditis seen on echocardiogram  - will need case management help given patient insurance issues: only has medicare  - surgical evaluation in progress and after iv antibiotics will need surgical intervention; interim imaging during antibiotic therapy  - serial cbc and monitor temperature   - reviewed prior medical records to evaluate for resistant or atypical pathogens   - iv hydration and supportive care   2. other issues: per medicine

## 2021-03-21 NOTE — PROGRESS NOTE ADULT - SUBJECTIVE AND OBJECTIVE BOX
Date of service: 03-21-21 @ 14:48      Patient lying in bed; no complaints, afebrile      ROS: no fever or chills; denies dizziness, no HA, no SOB or cough, no abdominal pain, no diarrhea or constipation; no dysuria, no urinary frequency, no legs pain, no rashes    MEDICATIONS  (STANDING):  apixaban 5 milliGRAM(s) Oral every 12 hours  cefTRIAXone Injectable. 2000 milliGRAM(s) IV Push every 24 hours  digoxin     Tablet 250 MICROGram(s) Oral daily  metoprolol tartrate 25 milliGRAM(s) Oral every 8 hours    MEDICATIONS  (PRN):  aluminum hydroxide/magnesium hydroxide/simethicone Suspension 30 milliLiter(s) Oral every 4 hours PRN Dyspepsia  ondansetron Injectable 4 milliGRAM(s) IV Push every 6 hours PRN Nausea      Vital Signs Last 24 Hrs  T(C): 36.4 (21 Mar 2021 08:33), Max: 36.7 (20 Mar 2021 20:43)  T(F): 97.6 (21 Mar 2021 08:33), Max: 98 (20 Mar 2021 20:43)  HR: 95 (21 Mar 2021 08:33) (95 - 112)  BP: 108/65 (21 Mar 2021 13:40) (107/52 - 123/93)  BP(mean): --  RR: 17 (21 Mar 2021 08:33) (17 - 18)  SpO2: 95% (21 Mar 2021 08:33) (93% - 95%)        Physical Exam:    Constitutional: frail looking  HEENT: NC/AT, EOMI, PERRLA, conjunctivae clear; ears and nose atraumatic; pharynx clear  Neck: supple; thyroid not palpable  Back: no tenderness  Respiratory: respiratory effort normal; clear to auscultation  Cardiovascular: S1S2 regular, no murmurs  Abdomen: soft, not tender, not distended, positive BS; no liver or spleen organomegaly  Genitourinary: no suprapubic tenderness  Musculoskeletal: no muscle tenderness, no joint swelling or tenderness  Neurological/ Psychiatric: AxOx3, judgement and insight normal;  moving all extremities  Skin: no rashes; no palpable lesions    Labs: all available labs reviewed                       Labs:                        13.7   6.28  )-----------( 453      ( 21 Mar 2021 08:04 )             41.3     03-21    140  |  108  |  11  ----------------------------<  102<H>  3.6   |  24  |  0.85    Ca    8.9      21 Mar 2021 08:04  Mg     2.2     03-21    TPro  6.9  /  Alb  2.2<L>  /  TBili  0.9  /  DBili  x   /  AST  40<H>  /  ALT  42  /  AlkPhos  73  03-20           Cultures:       Culture - Blood (collected 03-19-21 @ 20:37)  Source: .Blood None  Preliminary Report (03-21-21 @ 02:01):    No growth to date.    Culture - Abscess with Gram Stain (collected 03-19-21 @ 12:30)  Source: .Abscess liver  Preliminary Report (03-20-21 @ 16:13):    No growth    Culture - Urine (collected 03-15-21 @ 15:07)  Source: .Urine Clean Catch (Midstream)  Final Report (03-16-21 @ 22:27):    <10,000 CFU/mL Normal Urogenital Bel    Culture - Blood (collected 03-15-21 @ 13:30)  Source: .Blood None  Final Report (03-20-21 @ 19:00):    No Growth Final    Culture - Blood (collected 03-15-21 @ 12:48)  Source: .Blood Blood-Peripheral  Gram Stain (03-19-21 @ 06:31):    Growth in anaerobic bottle: Gram positive cocci in pairs  Preliminary Report (03-19-21 @ 06:32):    Growth in anaerobic bottle: Gram positive cocci in pairs    ***Blood Panel PCR results on this specimen are available    approximately 3 hours after the Gram stain result.***    Gram stain, PCR, and/or culture results may not always    correspond due to difference in methodologies.    ************************************************************    This PCR assay was performed by multiplex PCR. This    Assay tests for 66 bacterial and resistance gene targets.    Please refer to the [email or web address] test directory    at [email or web address] for details.  Organism: Blood Culture PCR (03-19-21 @ 08:34)  Organism: Blood Culture PCR (03-19-21 @ 08:34)      -  Streptococcus anginosus group: Detec      Method Type: PCR          < from: MR Abdomen w/wo IV Cont (03.18.21 @ 12:43) >    IMPRESSION:  *  Thick-walled, rim-enhancing lesion in the right lobe of the liver measures 4.3 x 4.1 cm. This may represent a collapsed cyst versus abscess.  *  Previously seen right paracolic gutter nodule on CT is nonenhancing and inconsistent with a peritoneal implant.      < end of copied text >        < from: CT Abdomen and Pelvis w/ IV Cont (03.15.21 @ 14:27) >  IMPRESSION:  Soft tissue mass centered around the right adnexa appears to be emanating from the appendix/cecum and secondarily involving the right adnexa concerning for neoplasm which is likely appendiceal/cecal in origin versus right ovarian. Consideration for colonoscopy for further evaluation.    Indeterminate hepatic lesion for which MRI is recommendedfor further evaluation. Right paracolic gutter peritoneal nodularity for which carcinomatosis cannot be excluded.      < end of copied text >                < from: TTE Echo Complete w/o Contrast w/ Doppler (03.16.21 @ 10:34) >     Impression     Summary     Endocardium is not well visualized, however, overall left ventricular   systolic function appears normal. Estimated left ventricular ejection   fraction is 55%.   The left atrium is moderately dilated.      < end of copied text >              Radiology: all available radiological tests reviewed    Advanced directives addressed: full resuscitation

## 2021-03-21 NOTE — PROGRESS NOTE ADULT - SUBJECTIVE AND OBJECTIVE BOX
Pt seen and examined at bedside, no acute events. Pt had no complaints. Denied fever, chills, nausea, vomiting or SOB overnight.               Vital Signs Last 24 Hrs  T(C): 36.4 (21 Mar 2021 08:33), Max: 36.7 (20 Mar 2021 20:43)  T(F): 97.6 (21 Mar 2021 08:33), Max: 98 (20 Mar 2021 20:43)  HR: 95 (21 Mar 2021 08:33) (95 - 112)  BP: 123/93 (21 Mar 2021 08:33) (107/52 - 123/93)  BP(mean): --  RR: 17 (21 Mar 2021 08:33) (17 - 18)  SpO2: 95% (21 Mar 2021 08:33) (93% - 95%)    Labs:                                13.7   6.28  )-----------( 453      ( 21 Mar 2021 08:04 )             41.3     CBC Full  -  ( 21 Mar 2021 08:04 )  WBC Count : 6.28 K/uL  RBC Count : 4.52 M/uL  Hemoglobin : 13.7 g/dL  Hematocrit : 41.3 %  Platelet Count - Automated : 453 K/uL  Mean Cell Volume : 91.4 fl  Mean Cell Hemoglobin : 30.3 pg  Mean Cell Hemoglobin Concentration : 33.2 gm/dL  Auto Neutrophil # : x  Auto Lymphocyte # : x  Auto Monocyte # : x  Auto Eosinophil # : x  Auto Basophil # : x  Auto Neutrophil % : x  Auto Lymphocyte % : x  Auto Monocyte % : x  Auto Eosinophil % : x  Auto Basophil % : x    03-20    140  |  109<H>  |  11  ----------------------------<  112<H>  3.6   |  24  |  0.81    Ca    8.6      20 Mar 2021 08:41    TPro  6.9  /  Alb  2.2<L>  /  TBili  0.9  /  DBili  x   /  AST  40<H>  /  ALT  42  /  AlkPhos  73  03-20    LIVER FUNCTIONS - ( 20 Mar 2021 08:41 )  Alb: 2.2 g/dL / Pro: 6.9 gm/dL / ALK PHOS: 73 U/L / ALT: 42 U/L / AST: 40 U/L / GGT: x                 Meds:  aluminum hydroxide/magnesium hydroxide/simethicone Suspension 30 milliLiter(s) Oral every 4 hours PRN  apixaban 5 milliGRAM(s) Oral every 12 hours  cefTRIAXone Injectable. 2000 milliGRAM(s) IV Push every 24 hours  digoxin     Tablet 250 MICROGram(s) Oral daily  metoprolol tartrate 25 milliGRAM(s) Oral every 8 hours  ondansetron Injectable 4 milliGRAM(s) IV Push every 6 hours PRN        Physical Exam:  General: AAOx3, in NAD  HEENT: NC/AT, EOMI  Cardio: irregularly irregular  Pulm: equal air entry b/l, non labored  Abdomen: soft, obese, NT/ND

## 2021-03-21 NOTE — PROGRESS NOTE ADULT - SUBJECTIVE AND OBJECTIVE BOX
71 y/o female with a PMHx of Crohns presents to the ED for evaluation. Pt reports having cough, n/v/d and feeling fatigued over the last few days. Pt fearful of being admitted and doesn't have a PCP; her  tells me he made her come, due to the fact that she was v weak, not eating and he was concerned.  Pt has new onset AF was RVR when she arrived and I believe this was the main reason her her feeling weak, she needs a CT abd to r/o IBD flare, doubt any ischemic colitis ( no blood with the diarrhea, but in view of the new AF worth ruling out). Case d/w ; pt hasn't been on any meds for years, doesn't have a GI I can call.  Pt tells me she stopped eating weeks ago.    3/16: Patient denies any complaints. Potassium is critically low and will need to be replenished prior to EGD with anesthesia.   03/17 Patient seen and examined at bedside before going to colonoscopy, was NPO due to colonoscopy   Was having diarrhea   03/18 Patient denies any pain, I answered all her questions about colonoscopy finding yesterday, aware  of a  mass , given support  3/20- anxious to go home, states she feels great, denied any palpitations despite afib with RVR   3/21 - upset that she has to stay longer but does understand that she needs to stay for a line and IV abx; in addn counseled about eliquis   she denies cp palps sob abdo pain tingling numbness       PHYSICAL EXAM:  Vital Signs Last 24 Hrs  T(F): 98.3 (21 Mar 2021 15:47), Max: 98.3 (21 Mar 2021 15:47)  HR: 83 (21 Mar 2021 15:47) (83 - 102)  BP: 122/71 (21 Mar 2021 15:47) (108/65 - 123/93)  RR: 18 (21 Mar 2021 15:47) (17 - 18)  SpO2: 96% (21 Mar 2021 15:47) (93% - 96%)  Constitutional: NAD, awake and alert, well-developed  HEENT: PERR, EOMI, Normal Hearing, MMM  Neck: Soft and supple, No LAD, No JVD  Respiratory: Breath sounds are clear bilaterally, No wheezing, rales or rhonchi  Cardiovascular: S1 and S2, regular rate and rhythm, no Murmurs, gallops or rubs  Gastrointestinal: Bowel Sounds present, soft, nontender, nondistended, no guarding, no rebound  Extremities: No peripheral edema  Vascular: 2+ peripheral pulses  Neurological: A/O x 3, no focal deficits  Musculoskeletal: 5/5 strength b/l upper and lower extremities  Skin: No rashes      RAD   CT Abdomen and Pelvis w/ IV Cont (03.15.21 @ 14:27) >  Soft tissue mass centered around the right adnexa appears to be emanating from the appendix/cecum and secondarily involving the right adnexa concerning for neoplasm which is likely appendiceal/cecal in origin versus right ovarian. Consideration for colonoscopy for further evaluation.  Indeterminate hepatic lesion for which MRI is recommended for further evaluation. Right paracolic gutter peritoneal nodularity for which carcinomatosis cannot be excluded.    ECHOCARDIOGRAM:  TTE Echo Complete w/o Contrast w/ Doppler (03.16.21 @ 10:34) >   Impression   Summary   Endocardium is not well visualized, however, overall left ventricular   systolic function appears normal. Estimated left ventricular ejection   fraction is 55%.   The left atrium is moderately dilated.    Diagnosis Line Atrial fibrillation with rapid ventricular response    LABS: All Labs Reviewed:                        13.7   6.28  )-----------( 453      ( 21 Mar 2021 08:04 )             41.3     03-21    140  |  108  |  11  ----------------------------<  102<H>  3.6   |  24  |  0.85    Ca    8.9      21 Mar 2021 08:04  Mg     2.2     03-21    TPro  6.9  /  Alb  2.2<L>  /  TBili  0.9  /  DBili  x   /  AST  40<H>  /  ALT  42  /  AlkPhos  73  03-20    MEDS:   aluminum hydroxide/magnesium hydroxide/simethicone Suspension 30 milliLiter(s) Oral every 4 hours PRN  apixaban 5 milliGRAM(s) Oral every 12 hours  cefTRIAXone Injectable. 2000 milliGRAM(s) IV Push every 24 hours  digoxin     Tablet 250 MICROGram(s) Oral daily  metoprolol tartrate 25 milliGRAM(s) Oral every 8 hours  ondansetron Injectable 4 milliGRAM(s) IV Push every 6 hours PRN

## 2021-03-22 LAB
ANION GAP SERPL CALC-SCNC: 6 MMOL/L — SIGNIFICANT CHANGE UP (ref 5–17)
BUN SERPL-MCNC: 9 MG/DL — SIGNIFICANT CHANGE UP (ref 7–23)
CALCIUM SERPL-MCNC: 8.8 MG/DL — SIGNIFICANT CHANGE UP (ref 8.5–10.1)
CHLORIDE SERPL-SCNC: 107 MMOL/L — SIGNIFICANT CHANGE UP (ref 96–108)
CO2 SERPL-SCNC: 26 MMOL/L — SIGNIFICANT CHANGE UP (ref 22–31)
CREAT SERPL-MCNC: 0.79 MG/DL — SIGNIFICANT CHANGE UP (ref 0.5–1.3)
GLUCOSE SERPL-MCNC: 95 MG/DL — SIGNIFICANT CHANGE UP (ref 70–99)
HCT VFR BLD CALC: 40.1 % — SIGNIFICANT CHANGE UP (ref 34.5–45)
HGB BLD-MCNC: 13.2 G/DL — SIGNIFICANT CHANGE UP (ref 11.5–15.5)
MAGNESIUM SERPL-MCNC: 2.1 MG/DL — SIGNIFICANT CHANGE UP (ref 1.6–2.6)
MCHC RBC-ENTMCNC: 30.3 PG — SIGNIFICANT CHANGE UP (ref 27–34)
MCHC RBC-ENTMCNC: 32.9 GM/DL — SIGNIFICANT CHANGE UP (ref 32–36)
MCV RBC AUTO: 92.2 FL — SIGNIFICANT CHANGE UP (ref 80–100)
PLATELET # BLD AUTO: 406 K/UL — HIGH (ref 150–400)
POTASSIUM SERPL-MCNC: 3.5 MMOL/L — SIGNIFICANT CHANGE UP (ref 3.5–5.3)
POTASSIUM SERPL-SCNC: 3.5 MMOL/L — SIGNIFICANT CHANGE UP (ref 3.5–5.3)
RBC # BLD: 4.35 M/UL — SIGNIFICANT CHANGE UP (ref 3.8–5.2)
RBC # FLD: 13.8 % — SIGNIFICANT CHANGE UP (ref 10.3–14.5)
SARS-COV-2 RNA SPEC QL NAA+PROBE: SIGNIFICANT CHANGE UP
SODIUM SERPL-SCNC: 139 MMOL/L — SIGNIFICANT CHANGE UP (ref 135–145)
WBC # BLD: 5.62 K/UL — SIGNIFICANT CHANGE UP (ref 3.8–10.5)
WBC # FLD AUTO: 5.62 K/UL — SIGNIFICANT CHANGE UP (ref 3.8–10.5)

## 2021-03-22 PROCEDURE — 99233 SBSQ HOSP IP/OBS HIGH 50: CPT

## 2021-03-22 PROCEDURE — 99232 SBSQ HOSP IP/OBS MODERATE 35: CPT

## 2021-03-22 RX ADMIN — Medication 25 MILLIGRAM(S): at 05:42

## 2021-03-22 RX ADMIN — Medication 250 MICROGRAM(S): at 11:05

## 2021-03-22 RX ADMIN — APIXABAN 5 MILLIGRAM(S): 2.5 TABLET, FILM COATED ORAL at 21:19

## 2021-03-22 RX ADMIN — CEFTRIAXONE 2000 MILLIGRAM(S): 500 INJECTION, POWDER, FOR SOLUTION INTRAMUSCULAR; INTRAVENOUS at 21:19

## 2021-03-22 RX ADMIN — Medication 25 MILLIGRAM(S): at 00:02

## 2021-03-22 RX ADMIN — APIXABAN 5 MILLIGRAM(S): 2.5 TABLET, FILM COATED ORAL at 11:04

## 2021-03-22 RX ADMIN — Medication 25 MILLIGRAM(S): at 21:19

## 2021-03-22 RX ADMIN — Medication 25 MILLIGRAM(S): at 16:04

## 2021-03-22 NOTE — PROGRESS NOTE ADULT - ASSESSMENT
69 y/o female with a PMHx of Crohns presents with cough, n/v/d and feeling fatigued over the last few days. New onset afib noted in ED. Patient has no PCP or recent medical care.    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 Colonoscopy biopsy results pending.   Gyn onc eval  3/20 - discussed with Dr Ervin - appendix, contained perforation, liver abscess - drained, cultres pending  currently on pip tazo. ID consult for duration of treatment.   3/21 - plan for 6 weeks of ABx per ID  3/22 - plan for PICC Line tomorrow     #Sepsis likely present on admission Gram (+) Cocci Bacteremia  L Abcsess vs Cyst   Blood cx Anaerobic Bottle Gram + Cocci in pairs cultures in progress  Thick-walled, rim-enhancing lesion in the right lobe of the liver measures 4.3 x 4.1 cm. This may represent a collapsed cyst versus abscess.  IR Consulted Liver Biopsy done   liver cyst vs abscess aspiration with 21G x 15cm Chiba, with aspiration of 8cc of pus and some serosanguinous material, sent for gram stain, culture and cytology; f/u cultures liver abscess as well as rpt blood cxs           # New onset AF, vs chronic undiagnosed   Patient doesn't have a PCP;  intermittent RVR  -suspect dehydration to contribute to it  -on Metoprolol increased to 25mg bd   -TTE- EF 55%. The left atrium is moderately dilated.  3/20 - AFIB WITH RVR TODAY   on DIGOXIN per Card  PIETRO VASC 2 for age and gender - rec AC with ELIQUIS,  will call Card; will start tonight if okay with Surgery   3/21 - on eliquis, pt counseled re the risks benefits of AC use and precautions to take while on it  reduce dose of eliquis on discharge     #HILARY, hyponatremia sec to dehydration  -s/p NS@80, resolved       #GOC:  Pt anxious and just wants to go home   #NOK:  Mr Ng [phone]  discussed with RN and Surgery  Card; spoke to Mr Ng 3/21   SAÚL ELDER

## 2021-03-22 NOTE — PROGRESS NOTE ADULT - SUBJECTIVE AND OBJECTIVE BOX
69 y/o woman with a history of Crohns Disease and lack of regular medical attention admitted on 3/15/21 with new onset AF with possible Crohns' exacerbation vs ischemic colitis and with abdominal CT revealing a soft tissue mass around the right adnexa.    3/16/'21: no complaints. discussed CT scan findings. colonoscopy rescheduled tomorrow K low today. discussed CV pt reluctant to do this.  3/17/21:  No new complaints; occasional palpitations.  3/18/21:  Upset about appendiceal tumor; no cardiac symptoms.  3/19/21:  No new complaints; awaiting plan from surgical teams  3/20/21: Frustrated with illness but feels well - no palpitations, no abdominal pain.    3/21/21:  Eager to return home; feels ok; ambulated in hallway yesterday  3/22/'21: no complaints    MEDICATIONS:  OUTPATIENT  Home Medications:  Refresh ophthalmic solution: 1 drop(s) to each affected eye once a day, As Needed - for dry eyes (15 Mar 2021 14:38)    INPATIENT  MEDICATIONS  (STANDING):  apixaban 5 milliGRAM(s) Oral every 12 hours  cefTRIAXone Injectable. 2000 milliGRAM(s) IV Push every 24 hours  digoxin     Tablet 250 MICROGram(s) Oral daily  metoprolol tartrate 25 milliGRAM(s) Oral every 8 hours    MEDICATIONS  (PRN):  aluminum hydroxide/magnesium hydroxide/simethicone Suspension 30 milliLiter(s) Oral every 4 hours PRN Dyspepsia  ondansetron Injectable 4 milliGRAM(s) IV Push every 6 hours PRN Nausea      Vital Signs Last 24 Hrs  T(C): 36.3 (22 Mar 2021 16:04), Max: 36.6 (22 Mar 2021 07:31)  T(F): 97.4 (22 Mar 2021 16:04), Max: 97.9 (22 Mar 2021 07:31)  HR: 105 (22 Mar 2021 16:04) (85 - 105)  BP: 132/80 (22 Mar 2021 16:04) (98/82 - 132/80)  BP(mean): --  RR: 18 (22 Mar 2021 16:04) (17 - 18)  SpO2: 94% (22 Mar 2021 16:04) (94% - 97%)Daily     Daily I&O's Summary    PHYSICAL EXAM:  Constitutional: NAD, awake and alert, obese  Respiratory: Breath sounds are clear bilaterally  Cardiovascular: Irregular, no murmur  Gastrointestinal: Bowel Sounds present, abdomen is soft, nontender.   psych: Appropriate mood and affect      LABS: All Labs Reviewed:                        13.2   5.62  )-----------( 406      ( 22 Mar 2021 08:04 )             40.1                         13.7   6.28  )-----------( 453      ( 21 Mar 2021 08:04 )             41.3                         12.9   6.75  )-----------( 419      ( 20 Mar 2021 08:41 )             39.5     22 Mar 2021 08:04    139    |  107    |  9      ----------------------------<  95     3.5     |  26     |  0.79   21 Mar 2021 08:04    140    |  108    |  11     ----------------------------<  102    3.6     |  24     |  0.85   20 Mar 2021 08:41    140    |  109    |  11     ----------------------------<  112    3.6     |  24     |  0.81     Ca    8.8        22 Mar 2021 08:04  Ca    8.9        21 Mar 2021 08:04  Ca    8.6        20 Mar 2021 08:41  Mg     2.1       22 Mar 2021 08:04  Mg     2.2       21 Mar 2021 08:04    TPro  6.9    /  Alb  2.2    /  TBili  0.9    /  DBili  x      /  AST  40     /  ALT  42     /  AlkPhos  73     20 Mar 2021 08:41    12 Lead ECG (03.15.21 @ 12:36):  Atrial fibrillation with rapid ventricular response  ST & T wave abnormality, consider inferior ischemia    CT Abdomen and Pelvis w/ IV Cont (03.15.21 @ 14:27):  Soft tissue mass centered around the right adnexa appears to be emanating from the appendix/cecum and secondarily involving the right adnexa concerning for neoplasm which is likely appendiceal/cecal in origin versus right ovarian. Consideration for colonoscopy for further evaluation. Indeterminate hepatic lesion for which MRI is recommended for further evaluation. Right paracolic gutter peritoneal nodularity for which carcinomatosis cannot be excluded.    TTE Echo Complete w/o Contrast w/ Doppler (03.16.21 @ 10:34):   Endocardium is not well visualized, however, overall left ventricular systolic function appears normal. Estimated left ventricular ejection fraction is 55%.   The left atrium is moderately dilated.    Tele:  AF

## 2021-03-22 NOTE — PHYSICAL THERAPY INITIAL EVALUATION ADULT - GENERAL OBSERVATIONS, REHAB EVAL
Pt was found sitting outside her room on 5E as heating was getting fixed, Pt is pleasant and willing to participate in PT

## 2021-03-22 NOTE — PROGRESS NOTE ADULT - ASSESSMENT
71 yo F with appendiceal mass seen on CT involving right adnexa and part of cecum, on colonoscopy, appendix biopsied, noted terminal ileum and cecum without mass.  Pt likely had perforated appendicitis with phlegmon to the liver and right adnexa.    Colonoscopy biopsy results: negative for malignancy, benign colonic mucosa with acute inflammation, ulceration, granulation tissue and reactive changes  IR cultures: no growth    Plan:  -f/u IR cultures gram stain  -c/w IV abx  -ID consult appreciated  -repeat blood cultures  -needs IV abx and PICC placement once blood cultures neg  -pt will benefit from interval appendectomy vs ileocecectomy   -medical management as per primary team  -colorectal surgery will follow    Plan discussed with Dr. Ervin

## 2021-03-22 NOTE — PROGRESS NOTE ADULT - SUBJECTIVE AND OBJECTIVE BOX
69 y/o female with a PMHx of Crohns presents to the ED for evaluation. Pt reports having cough, n/v/d and feeling fatigued over the last few days. Pt fearful of being admitted and doesn't have a PCP; her  tells me he made her come, due to the fact that she was v weak, not eating and he was concerned.  Pt has new onset AF was RVR when she arrived and I believe this was the main reason her her feeling weak, she needs a CT abd to r/o IBD flare, doubt any ischemic colitis ( no blood with the diarrhea, but in view of the new AF worth ruling out). Case d/w ; pt hasn't been on any meds for years, doesn't have a GI I can call.  Pt tells me she stopped eating weeks ago.    3/16: Patient denies any complaints. Potassium is critically low and will need to be replenished prior to EGD with anesthesia.   03/17 Patient seen and examined at bedside before going to colonoscopy, was NPO due to colonoscopy   Was having diarrhea   03/18 Patient denies any pain, I answered all her questions about colonoscopy finding yesterday, aware  of a  mass , given support  3/20- anxious to go home, states she feels great, denied any palpitations despite afib with RVR   3/21 - upset that she has to stay longer but does understand that she needs to stay for a line and IV abx; in addn counseled about eliquis   she denies cp palps sob abdo pain tingling numbness   3/22 - no cp palps sob, is ambulating, no other complaints       PHYSICAL EXAM:  Vital Signs Last 24 Hrs  T(C): 36.3 (22 Mar 2021 16:04), Max: 36.6 (22 Mar 2021 07:31)  T(F): 97.4 (22 Mar 2021 16:04), Max: 97.9 (22 Mar 2021 07:31)  HR: 105 (22 Mar 2021 16:04) (85 - 105)  BP: 132/80 (22 Mar 2021 16:04) (98/82 - 132/80)  RR: 18 (22 Mar 2021 16:04) (17 - 18)  SpO2: 94% (22 Mar 2021 16:04) (94% - 97%)  Constitutional: NAD, awake and alert, well-developed  HEENT: PERR, EOMI, Normal Hearing, MMM  Neck: Soft and supple, No LAD, No JVD  Respiratory: Breath sounds are clear bilaterally, No wheezing, rales or rhonchi  Cardiovascular: S1 and S2, regular rate and rhythm, no Murmurs, gallops or rubs  Gastrointestinal: Bowel Sounds present, soft, nontender, nondistended, no guarding, no rebound  Extremities: No peripheral edema  Vascular: 2+ peripheral pulses  Neurological: A/O x 3, no focal deficits  Musculoskeletal: 5/5 strength b/l upper and lower extremities  Skin: No rashes      RAD   CT Abdomen and Pelvis w/ IV Cont (03.15.21 @ 14:27) >  Soft tissue mass centered around the right adnexa appears to be emanating from the appendix/cecum and secondarily involving the right adnexa concerning for neoplasm which is likely appendiceal/cecal in origin versus right ovarian. Consideration for colonoscopy for further evaluation.  Indeterminate hepatic lesion for which MRI is recommended for further evaluation. Right paracolic gutter peritoneal nodularity for which carcinomatosis cannot be excluded.    ECHOCARDIOGRAM:  TTE Echo Complete w/o Contrast w/ Doppler (03.16.21 @ 10:34) >   Impression   Summary   Endocardium is not well visualized, however, overall left ventricular   systolic function appears normal. Estimated left ventricular ejection   fraction is 55%.   The left atrium is moderately dilated.    Diagnosis Line Atrial fibrillation with rapid ventricular response  LABS: All Labs Reviewed:                        13.2   5.62  )-----------( 406      ( 22 Mar 2021 08:04 )             40.1     03-22    139  |  107  |  9   ----------------------------<  95  3.5   |  26  |  0.79    Ca    8.8      22 Mar 2021 08:04  Mg     2.1     03-22          aluminum hydroxide/magnesium hydroxide/simethicone Suspension 30 milliLiter(s) Oral every 4 hours PRN  apixaban 5 milliGRAM(s) Oral every 12 hours  cefTRIAXone Injectable. 2000 milliGRAM(s) IV Push every 24 hours  digoxin     Tablet 250 MICROGram(s) Oral daily  metoprolol tartrate 25 milliGRAM(s) Oral every 8 hours  ondansetron Injectable 4 milliGRAM(s) IV Push every 6 hours PRN

## 2021-03-22 NOTE — PHYSICAL THERAPY INITIAL EVALUATION ADULT - PERTINENT HX OF CURRENT PROBLEM, REHAB EVAL
Pt was admitted on 3/15 for evaluation of fatigue, nausea, vomiting diarrhea and not eating; upon admission was in rapid afib; workup reveals Strep anginosus in blood and imaging reveals inflamed appendix and liver abscess. The patient will need interval iv antibiotics and then eventual appendectomy. She notes discomfort at the site of the IR procedure for the liver abscess otherwise has improved since admission

## 2021-03-22 NOTE — PROGRESS NOTE ADULT - SUBJECTIVE AND OBJECTIVE BOX
Date of service: 03-22-21 @ 13:25      Patient lying in bed; no complaints, afebrile      ROS: no fever or chills; denies dizziness, no HA, no SOB or cough, no abdominal pain, no diarrhea or constipation; no dysuria, no urinary frequency, no legs pain, no rashes    MEDICATIONS  (STANDING):  apixaban 5 milliGRAM(s) Oral every 12 hours  cefTRIAXone Injectable. 2000 milliGRAM(s) IV Push every 24 hours  digoxin     Tablet 250 MICROGram(s) Oral daily  metoprolol tartrate 25 milliGRAM(s) Oral every 8 hours    MEDICATIONS  (PRN):  aluminum hydroxide/magnesium hydroxide/simethicone Suspension 30 milliLiter(s) Oral every 4 hours PRN Dyspepsia  ondansetron Injectable 4 milliGRAM(s) IV Push every 6 hours PRN Nausea      Vital Signs Last 24 Hrs  T(C): 36.6 (22 Mar 2021 07:31), Max: 36.8 (21 Mar 2021 15:47)  T(F): 97.9 (22 Mar 2021 07:31), Max: 98.3 (21 Mar 2021 15:47)  HR: 85 (22 Mar 2021 07:31) (83 - 95)  BP: 113/89 (22 Mar 2021 07:31) (98/82 - 122/71)  BP(mean): --  RR: 18 (22 Mar 2021 07:31) (17 - 18)  SpO2: 95% (22 Mar 2021 07:31) (94% - 97%)        Physical Exam:          Constitutional: frail looking  HEENT: NC/AT, EOMI, PERRLA, conjunctivae clear; ears and nose atraumatic; pharynx clear  Neck: supple; thyroid not palpable  Back: no tenderness  Respiratory: respiratory effort normal; clear to auscultation  Cardiovascular: S1S2 regular, no murmurs  Abdomen: soft, not tender, not distended, positive BS; no liver or spleen organomegaly  Genitourinary: no suprapubic tenderness  Musculoskeletal: no muscle tenderness, no joint swelling or tenderness  Neurological/ Psychiatric: AxOx3, judgement and insight normal;  moving all extremities  Skin: no rashes; no palpable lesions    Labs: all available labs reviewed                       Labs:                        Labs:                        13.2   5.62  )-----------( 406      ( 22 Mar 2021 08:04 )             40.1     03-22    139  |  107  |  9   ----------------------------<  95  3.5   |  26  |  0.79    Ca    8.8      22 Mar 2021 08:04  Mg     2.1     03-22             Cultures:       Culture - Blood (collected 03-20-21 @ 20:10)  Source: .Blood None  Preliminary Report (03-22-21 @ 02:01):    No growth to date.    Culture - Blood (collected 03-20-21 @ 20:10)  Source: .Blood None  Preliminary Report (03-22-21 @ 02:01):    No growth to date.    Culture - Blood (collected 03-19-21 @ 20:37)  Source: .Blood None  Preliminary Report (03-21-21 @ 02:01):    No growth to date.    Culture - Abscess with Gram Stain (collected 03-19-21 @ 12:30)  Source: .Abscess liver  Preliminary Report (03-20-21 @ 16:13):    No growth    Culture - Urine (collected 03-15-21 @ 15:07)  Source: .Urine Clean Catch (Midstream)  Final Report (03-16-21 @ 22:27):    <10,000 CFU/mL Normal Urogenital Bel    Culture - Blood (collected 03-15-21 @ 13:30)  Source: .Blood None  Final Report (03-20-21 @ 19:00):    No Growth Final                < from: MR Abdomen w/wo IV Cont (03.18.21 @ 12:43) >    IMPRESSION:  *  Thick-walled, rim-enhancing lesion in the right lobe of the liver measures 4.3 x 4.1 cm. This may represent a collapsed cyst versus abscess.  *  Previously seen right paracolic gutter nodule on CT is nonenhancing and inconsistent with a peritoneal implant.      < end of copied text >        < from: CT Abdomen and Pelvis w/ IV Cont (03.15.21 @ 14:27) >  IMPRESSION:  Soft tissue mass centered around the right adnexa appears to be emanating from the appendix/cecum and secondarily involving the right adnexa concerning for neoplasm which is likely appendiceal/cecal in origin versus right ovarian. Consideration for colonoscopy for further evaluation.    Indeterminate hepatic lesion for which MRI is recommendedfor further evaluation. Right paracolic gutter peritoneal nodularity for which carcinomatosis cannot be excluded.      < end of copied text >                < from: TTE Echo Complete w/o Contrast w/ Doppler (03.16.21 @ 10:34) >     Impression     Summary     Endocardium is not well visualized, however, overall left ventricular   systolic function appears normal. Estimated left ventricular ejection   fraction is 55%.   The left atrium is moderately dilated.      < end of copied text >              Radiology: all available radiological tests reviewed    Advanced directives addressed: full resuscitation

## 2021-03-22 NOTE — CHART NOTE - NSCHARTNOTEFT_GEN_A_CORE
Chart reviewed  Right adnexal mass suspicious for perforated appendicitis w/ liver abscess as well  Primary team planning to DC today or tomorrow  Gyn/Onc will sign off    d/w Dr. Lujan

## 2021-03-22 NOTE — PROGRESS NOTE ADULT - ASSESSMENT
71 y/o female with a PMHx of Crohns admitted on 3/15 for evaluation of fatigue, nausea, vomiting diarrhea and not eating; upon admission was in rapid afib; workup reveals Strep anginosus in blood and imaging reveals inflamed appendix and liver abscess. The patient will need interval iv antibiotics and then eventual appendectomy. She notes discomfort at the site of the IR procedure for the liver abscess otherwise has improved since admission and is afebrile, no specific complaints.   1. Patient admitted with Strep anginosus in blood, liver abscess and probable lesion at the cecum/ appendix; most liver abscesses originate from the appendix and are Strep species; hopefully this will be the case and not a neoplasm  - the plan would be to treat the patient with six weeks of iv antibiotics, will optimize to ceftriaxone 2 grams daily until 5/1  - need to repeat blood cultures prior to placing a picc line--- pending; picc line is ordered for am of 3/23  - no evidence of endocarditis seen on echocardiogram  - will need case management help given patient insurance issues: only has medicare; however may have drug plan, this is being investigated; worse case scenario can [place] [facility] daily basis if the financial burden is onorous  - surgical evaluation in progress and after iv antibiotics will need surgical intervention; interim imaging during antibiotic therapy; should follow up with Dr. Ervin as outpatient for interval imaging and eventual appendectomy, reflect this in discharge paperwork  - serial cbc and monitor temperature   - reviewed prior medical records to evaluate for resistant or atypical pathogens   - iv hydration and supportive care   2. other issues: per medicine

## 2021-03-23 ENCOUNTER — TRANSCRIPTION ENCOUNTER (OUTPATIENT)
Age: 71
End: 2021-03-23

## 2021-03-23 VITALS — HEART RATE: 87 BPM | SYSTOLIC BLOOD PRESSURE: 123 MMHG | DIASTOLIC BLOOD PRESSURE: 64 MMHG

## 2021-03-23 LAB
CULTURE RESULTS: SIGNIFICANT CHANGE UP
ORGANISM # SPEC MICROSCOPIC CNT: SIGNIFICANT CHANGE UP
ORGANISM # SPEC MICROSCOPIC CNT: SIGNIFICANT CHANGE UP
SPECIMEN SOURCE: SIGNIFICANT CHANGE UP

## 2021-03-23 PROCEDURE — 36597 REPOSITION VENOUS CATHETER: CPT

## 2021-03-23 PROCEDURE — 71045 X-RAY EXAM CHEST 1 VIEW: CPT | Mod: 26,77

## 2021-03-23 PROCEDURE — 99231 SBSQ HOSP IP/OBS SF/LOW 25: CPT | Mod: GC

## 2021-03-23 PROCEDURE — 76000 FLUOROSCOPY <1 HR PHYS/QHP: CPT | Mod: 26

## 2021-03-23 PROCEDURE — 99232 SBSQ HOSP IP/OBS MODERATE 35: CPT

## 2021-03-23 PROCEDURE — 99239 HOSP IP/OBS DSCHRG MGMT >30: CPT

## 2021-03-23 PROCEDURE — 71045 X-RAY EXAM CHEST 1 VIEW: CPT | Mod: 26

## 2021-03-23 RX ORDER — DIGOXIN 250 MCG
1 TABLET ORAL
Qty: 30 | Refills: 0
Start: 2021-03-23 | End: 2021-04-21

## 2021-03-23 RX ORDER — JNJ-78436735 50000000000 [PFU]/.5ML
0.5 SUSPENSION INTRAMUSCULAR ONCE
Refills: 0 | Status: COMPLETED | OUTPATIENT
Start: 2021-03-23 | End: 2021-03-23

## 2021-03-23 RX ORDER — CEFTRIAXONE 500 MG/1
2 INJECTION, POWDER, FOR SOLUTION INTRAMUSCULAR; INTRAVENOUS
Qty: 0 | Refills: 0 | DISCHARGE
Start: 2021-03-23

## 2021-03-23 RX ORDER — METOPROLOL TARTRATE 50 MG
1 TABLET ORAL
Qty: 90 | Refills: 0
Start: 2021-03-23 | End: 2021-04-21

## 2021-03-23 RX ORDER — APIXABAN 2.5 MG/1
1 TABLET, FILM COATED ORAL
Qty: 60 | Refills: 0
Start: 2021-03-23 | End: 2021-04-21

## 2021-03-23 RX ADMIN — Medication 250 MICROGRAM(S): at 09:52

## 2021-03-23 RX ADMIN — Medication 25 MILLIGRAM(S): at 05:15

## 2021-03-23 RX ADMIN — Medication 25 MILLIGRAM(S): at 14:40

## 2021-03-23 RX ADMIN — APIXABAN 5 MILLIGRAM(S): 2.5 TABLET, FILM COATED ORAL at 09:52

## 2021-03-23 RX ADMIN — JNJ-78436735 0.5 MILLILITER(S): 50000000000 SUSPENSION INTRAMUSCULAR at 17:03

## 2021-03-23 RX ADMIN — CEFTRIAXONE 2000 MILLIGRAM(S): 500 INJECTION, POWDER, FOR SOLUTION INTRAMUSCULAR; INTRAVENOUS at 14:44

## 2021-03-23 NOTE — PROGRESS NOTE ADULT - PROBLEM SELECTOR PLAN 1
Persistent AF with ventricular rates elevated at rest (+); increase metoprolol to 12.5mg q8hr; no anticoagulation until GI work-up complete.
Persistent; newly diagnosed but may have been present for sometime prior to admission in the setting of no regular medical attention;   HRs 80-90s rate control adequate.  continue metoprolol and digoxin; **decrease digoxin dose to 0.125 mg at time of discharge **; continue Eliquis.
Persistent; newly diagnosed but may have been present for sometime prior to admission in the setting of no regular medical attention; continue metoprolol and digoxin; **decrease digoxin dose to 0.125 mg at time of discharge **; continue Eliquis.
Ventricular rate often in the 90s at rest (in bed) but also accelerates to 140s suggesting poor rate control; increase metoprolol to 25mg q8hr. We discussed anticoagulation - her CHADS-VASC =  2; favor anticoagulation but awaiting operative plan (timing) before initiating therapy.
CHADS2-VASc=1-2 (>65,F).  Check Echo. Check TSH.  Check noctural O2 sats given BMI 31.9.  No ETOHism.  may be related to acute illness.  HRs 110s SBPs 120s, start metoprolol tartrate 12.5 po BID.  IM team hold anticoag pending trend of H&H & results of colonoscopy & EGD.  After scopes, would favor long term initiation of anticoag given CHADS2-VASc>1.
Persistent; newly diagnosed but may have been present for sometime prior to admission in the setting of no regular medical attention;   HRs 80-90s rate control adequate.  continue metoprolol and digoxin; **decrease digoxin dose to 0.125 mg at time of discharge **; continue Eliquis.
Suboptimal ventricular rate control; increase metoprolol to 25mg bid; we discussed potential DCCV but she is not interested at this time; await colorectal plan prior to anticoagulation.
Persistent -- unclear time of onset (new diagnosis) but she suspects many months; ventricular rate still poorly controlled (marked increase in HR with exertion); BP limits further titration in metoprolol; start digoxin.  Start anticoagulation if no imminent plan for abdominal surgery.

## 2021-03-23 NOTE — DISCHARGE NOTE PROVIDER - HOSPITAL COURSE
71 y/o female with a PMHx of Crohns presents to the ED for evaluation. Pt reports having cough, n/v/d and feeling fatigued over the last few days. Pt fearful of being admitted and doesn't have a PCP; her  tells me he made her come, due to the fact that she was v weak, not eating and he was concerned.  Pt has new onset AF was RVR when she arrived and I believe this was the main reason her her feeling weak, she needs a CT abd to r/o IBD flare, doubt any ischemic colitis ( no blood with the diarrhea, but in view of the new AF worth ruling out). Case d/w ; pt hasn't been on any meds for years, doesn't have a GI I can call.    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 ID consult for duration of treatment.   Rpt CXS NTD   3/21 - plan for 6 weeks of ABx per ID  3/22 - plan for PICC Line tomorrow   3/23 - PICC Line today - needs readjustment by IR and can go home after that.  IV Abx have been arranged   Pt will benefit from interval appendectomy vs ileocecectomy , f/u with Dr OLSON         # New onset AF, vs chronic undiagnosed   Patient doesn't have a PCP;  intermittent RVR  -suspect dehydration to contribute to it  -on Metoprolol increased to 25mg bd   -TTE- EF 55%. The left atrium is moderately dilated.  3/20 - AFIB WITH RVR TODAY   on DIGOXIN per Card  PIETRO VASC 2 for age and gender - rec AC with ELIQUIS,  will call Card; will start tonight if okay with Surgery   3/21 - on eliquis, pt counseled re the risks benefits of AC use and precautions to take while on it  reduce dose of DIGOXIN on discharge     #HILARY, hyponatremia sec to dehydration  -s/p NS@80, resolved       #GOC:  Pt anxious and just wants to go home   #NOK:  Mr Ng [phone]  discussed with RN and Surgery  Card; spoke to Mr Ng 3/21   DC home today    OTHER DETAILS:  3/23 - no cp palps sob, no tingling or numbness, ambulating  PHYSICAL EXAM:  GENERAL: NAD, able to lie flat in bed  HEAD:  Atraumatic, Normocephalic  EYES: EOMI, PERRLA, normal sclera  ENT: Moist mucous membranes  NECK: Supple, No JVD, no nuchal rigidity  CHEST/LUNG: Clear to auscultation bilaterally; No rales, rhonchi, wheezing, or rubs. Unlabored respirations  HEART: Regular rate and rhythm; No murmurs, rubs, or gallops  ABDOMEN: Bowel sounds present; Soft, Nontender, Nondistended. No hepatomegaly  EXTREMITIES:  no pitting bilaterally  NERVOUS SYSTEM:  Alert & Oriented X3, speech clear. No focal motor or sensory deficits  MSK: FROM all 4 extremities, full and equal strength  SKIN: No rashes or lesions  LABS: All Labs Reviewed:                     13.2   5.62  )-----------( 406      ( 22 Mar 2021 08:04 )             40.1   139  |  107  |  9   ----------------------------<  95  3.5   |  26  |  0.79    RADIOLOGY/EKG:  < from: MR Abdomen w/wo IV Cont (03.18.21 @ 12:43) >  *  Thick-walled, rim-enhancing lesion in the right lobe of the liver measures 4.3 x 4.1 cm. This may represent a collapsed cyst versus abscess.  *  Previously seen right paracolic gutter nodule on CT is nonenhancing and inconsistent with a peritoneal implant.    POC discussed with team incl IR  time spent on discharge - 55 mins

## 2021-03-23 NOTE — PROGRESS NOTE ADULT - ASSESSMENT
71 yo F with appendiceal mass seen on CT involving right adnexa and part of cecum, on colonoscopy, appendix biopsied, noted terminal ileum and cecum without mass.  Pt likely had perforated appendicitis with phlegmon to the liver and right adnexa.    Colonoscopy biopsy results: negative for malignancy, benign colonic mucosa with acute inflammation, ulceration, granulation tissue and reactive changes  IR cultures: no growth    Plan:  -c/w IV abx  -ID consult appreciated  -awaiting dispo and PICC placement today  -pt will benefit from interval appendectomy vs ileocecectomy   -medical management as per primary team  -colorectal surgery will follow    Plan discussed with Dr. Jacobo

## 2021-03-23 NOTE — PROGRESS NOTE ADULT - PROVIDER SPECIALTY LIST ADULT
Gastroenterology
Hospitalist
Colorectal Surgery
Gastroenterology
Gastroenterology
Hospitalist
Infectious Disease
Infectious Disease
Surgery
Colorectal Surgery
Hospitalist
Cardiology

## 2021-03-23 NOTE — DISCHARGE NOTE PROVIDER - CARE PROVIDERS DIRECT ADDRESSES
[email or web address]

## 2021-03-23 NOTE — DISCHARGE NOTE PROVIDER - CARE PROVIDER_API CALL
Jyoti Jacobo)  ColonRectal Surgery; Surgery  [address] [place] [address]  Phone: [phone]  Fax: [phone]  Follow Up Time: 2 weeks    Ruthann Garg)  Internal Medicine  [address], [address]  [place] [address]  Phone: [phone]  Fax: [phone]  Follow Up Time: 1 week    Christo Gaming)  Cardiovascular Disease; Internal Medicine  [address], [address]  [place] [address]  Phone: [phone]  Fax: [phone]  Follow Up Time: 1 week

## 2021-03-23 NOTE — PROGRESS NOTE ADULT - ATTENDING COMMENTS
Storm Valdez M.D.  Cardiology, [facility]  Cell: [phone]  Offices:    ([facility] Office)  [phone] ([facility] Office)
Storm Valdez M.D.  Cardiology, [facility]  Cell: [phone]  Offices:    ([facility] Office)  [phone] ([facility] Office)
Patient seen and examined with NP Bethany.  I was physically present for the key portions of the evaluation and management (E/M) service provided.  I agree with the above history, physical, and plan which I have reviewed and edited where appropriate.   - replete potassium po and IV and check at 3 PM tody and replete further  - procedure held til am during severe hypokalemia which was 2/2 diarrhea  - afib - hold off AC for now. start low dose metoprolol for rate control. transfer to [facility] and monitor for 24 hrs. case d/w dr diop   - hyponatremia and HILARY in setting of persistent diarrhea.  - resolved
Patient seen and examined with NP [place].  I was physically present for the key portions of the evaluation and management (E/M) service provided.  I agree with the above history, physical, and plan which I have reviewed and edited where appropriate.   - replete potassium po and IV and check at 3 PM tody and replete further  - procedure held til am during severe hypokalemia which was 2/2 diarrhea  - afib - hold off AC for now. start low dose metoprolol for rate control. transfer to [facility] and monitor for 24 hrs. case d/w dr diop   - hyponatremia and HILARY in setting of persistent diarrhea.  - resolved
Patient seen and examined. Doing well. Has no pain or fevers. Plan for long term antibiotics with plan for interval appendectomy with Dr. Jacobo. Stable for discharge
Pt seen and examined.    No pain, no significant changes.    Vital Signs Last 24 Hrs  T(C): 36.4 (21 Mar 2021 08:33), Max: 36.7 (20 Mar 2021 20:43)  T(F): 97.6 (21 Mar 2021 08:33), Max: 98 (20 Mar 2021 20:43)  HR: 95 (21 Mar 2021 08:33) (95 - 112)  BP: 123/93 (21 Mar 2021 08:33) (107/52 - 123/93)  BP(mean): --  RR: 17 (21 Mar 2021 08:33) (17 - 18)  SpO2: 95% (21 Mar 2021 08:33) (93% - 95%)    Abd ND/soft/NT                          13.7   6.28  )-----------( 453      ( 21 Mar 2021 08:04 )             41.3     Abscess and BCX NGTD    imp: hepatic abscess  --dispo planning for outpatient antibiotics  --plan interval appendectomy afterwards.
Pt seen and examined.    Pt feels well, no abdominal pain.  Tolerating low res diet, no nausea.    Vital Signs Last 24 Hrs  T(C): 36.8 (20 Mar 2021 08:39), Max: 36.8 (19 Mar 2021 21:03)  T(F): 98.3 (20 Mar 2021 08:39), Max: 98.3 (20 Mar 2021 08:39)  HR: 92 (20 Mar 2021 08:39) (92 - 115)  BP: 109/75 (20 Mar 2021 08:39) (103/69 - 109/75)  BP(mean): --  RR: 19 (20 Mar 2021 08:39) (18 - 19)  SpO2: 95% (20 Mar 2021 08:39) (94% - 95%)    Abd ND/soft                          12.9   6.75  )-----------( 419      ( 20 Mar 2021 08:41 )             39.5     imp: liver abscess, likely secondary from perforated appendicitis.  --on zosyn.  ID consult for outpatient abx recs  --home after dispo plans finalized
Pt seen and examined.    With BM.  No significant pain    Vital Signs Last 24 Hrs  T(C): 36.6 (22 Mar 2021 07:31), Max: 36.8 (21 Mar 2021 15:47)  T(F): 97.9 (22 Mar 2021 07:31), Max: 98.3 (21 Mar 2021 15:47)  HR: 85 (22 Mar 2021 07:31) (83 - 95)  BP: 113/89 (22 Mar 2021 07:31) (98/82 - 122/71)  BP(mean): --  RR: 18 (22 Mar 2021 07:31) (17 - 18)  SpO2: 95% (22 Mar 2021 07:31) (94% - 97%)    Abd ND/soft/NT    Abscess and blood cultures NGTD    imp: liver abscess with perforated appendicitis and phlegmon.  --awaiting dispo planning for outpatient IV abx.  --no new recs.
pt seen and examined    denies abdominal pain.  percutaneous drainage performed on liver abscess.    Vital Signs Last 24 Hrs  T(C): 36.8 (19 Mar 2021 21:03), Max: 36.8 (19 Mar 2021 21:03)  T(F): 98.2 (19 Mar 2021 21:03), Max: 98.2 (19 Mar 2021 21:03)  HR: 115 (19 Mar 2021 21:03) (87 - 115)  BP: 103/69 (19 Mar 2021 21:03) (103/69 - 114/68)  BP(mean): 78 (19 Mar 2021 13:28) (78 - 80)  RR: 18 (19 Mar 2021 21:03) (18 - 19)  SpO2: 94% (19 Mar 2021 21:03) (94% - 94%)  Abd ND/soft/NT    imp:appendiceal mass  --findings today may actually represent perforated appendicitis with associated liver abscess  --awaiting culture and pathology results from colonoscopy biopsies  --continue antibiotics, may need interval appendectomy
Patient seen and examined with NP [place].  I was physically present for the key portions of the evaluation and management (E/M) service provided.  I agree with the above history, physical, and plan which I have reviewed and edited where appropriate.   - replete potassium po and IV and check at 3 PM tody and replete further  - procedure held til am during severe hypokalemia which was 2/2 diarrhea  - afib - hold off AC for now. start low dose metoprolol for rate control. transfer to [facility] and monitor for 24 hrs. case d/w dr diop   - hyponatremia and HILARY in setting of persistent diarrhea.  - resolved
Seen and examined.  MRI pending.  Results of CT and colonoscopy reviewed.  Daniela eggs this AM.  AFVSS  NAD  soft, NTND  Labs reviewed  A/P -   Await biopsy results.  Gyn onc and surg onc eval.  MRI abdomen.  Will cont to follow. Surg planning in progress.  Pt reassured.

## 2021-03-23 NOTE — PROGRESS NOTE ADULT - SUBJECTIVE AND OBJECTIVE BOX
Pt seen and examined at bedside, no acute events. Pt had no complaints. Denied fever, chills, nausea, vomiting or SOB overnight. Vital signs reviewed.    Vital Signs Last 24 Hrs  T(C): 36.8 (23 Mar 2021 08:02), Max: 36.8 (23 Mar 2021 08:02)  T(F): 98.3 (23 Mar 2021 08:02), Max: 98.3 (23 Mar 2021 08:02)  HR: 109 (23 Mar 2021 08:02) (79 - 109)  BP: 102/72 (23 Mar 2021 08:02) (102/72 - 132/80)  BP(mean): --  RR: 17 (23 Mar 2021 04:56) (17 - 18)  SpO2: 96% (23 Mar 2021 08:02) (93% - 96%)    Labs:                          13.2   5.62  )-----------( 406      ( 22 Mar 2021 08:04 )             40.1   03-22    139  |  107  |  9   ----------------------------<  95  3.5   |  26  |  0.79    Ca    8.8      22 Mar 2021 08:04  Mg     2.1     03-22            Meds:  aluminum hydroxide/magnesium hydroxide/simethicone Suspension 30 milliLiter(s) Oral every 4 hours PRN  apixaban 5 milliGRAM(s) Oral every 12 hours  cefTRIAXone Injectable. 2000 milliGRAM(s) IV Push every 24 hours  digoxin     Tablet 250 MICROGram(s) Oral daily  metoprolol tartrate 25 milliGRAM(s) Oral every 8 hours  ondansetron Injectable 4 milliGRAM(s) IV Push every 6 hours PRN      Radiology:        Physical Exam:  General: AAOx3, in NAD  HEENT: NC/AT, EOMI  Cardio: irregularly irregular  Pulm: equal air entry b/l, non labored  Abdomen: soft, obese, NT/ND       Pt seen and examined at bedside, no acute events. Pt had no complaints. Denied fever, chills, nausea, vomiting or SOB overnight. Vital signs reviewed.    Vital Signs Last 24 Hrs  T(C): 36.8 (23 Mar 2021 08:02), Max: 36.8 (23 Mar 2021 08:02)  T(F): 98.3 (23 Mar 2021 08:02), Max: 98.3 (23 Mar 2021 08:02)  HR: 109 (23 Mar 2021 08:02) (79 - 109)  BP: 102/72 (23 Mar 2021 08:02) (102/72 - 132/80)  RR: 17 (23 Mar 2021 04:56) (17 - 18)  SpO2: 96% (23 Mar 2021 08:02) (93% - 96%)    Labs:                          13.2   5.62  )-----------( 406      ( 22 Mar 2021 08:04 )             40.1   03-22    139  |  107  |  9   ----------------------------<  95  3.5   |  26  |  0.79    Ca    8.8      22 Mar 2021 08:04  Mg     2.1     03-22      Meds:  aluminum hydroxide/magnesium hydroxide/simethicone Suspension 30 milliLiter(s) Oral every 4 hours PRN  apixaban 5 milliGRAM(s) Oral every 12 hours  cefTRIAXone Injectable. 2000 milliGRAM(s) IV Push every 24 hours  digoxin     Tablet 250 MICROGram(s) Oral daily  metoprolol tartrate 25 milliGRAM(s) Oral every 8 hours  ondansetron Injectable 4 milliGRAM(s) IV Push every 6 hours PRN      Radiology:      Physical Exam:  General: AAOx3, in NAD  HEENT: NC/AT, EOMI  Cardio: irregularly irregular  Pulm: equal air entry b/l, non labored  Abdomen: soft, obese, NT/ND

## 2021-03-23 NOTE — DISCHARGE NOTE NURSING/CASE MANAGEMENT/SOCIAL WORK - PATIENT PORTAL LINK FT
You can access the FollowMyHealth Patient Portal offered by [facility] by registering at the following website: [email or web address]. By joining [employer]’s FollowMyHealth portal, you will also be able to view your health information using other applications (apps) compatible with our system.

## 2021-03-23 NOTE — PROGRESS NOTE ADULT - PROBLEM SELECTOR PROBLEM 2
Abnormal CT scan
Abnormal echocardiogram

## 2021-03-23 NOTE — PROGRESS NOTE ADULT - REASON FOR ADMISSION
nausea, diarrhea

## 2021-03-23 NOTE — PROGRESS NOTE ADULT - PROBLEM SELECTOR PROBLEM 1
Atrial fibrillation
Atrial fibrillation and flutter
Atrial fibrillation

## 2021-03-23 NOTE — PROGRESS NOTE ADULT - SUBJECTIVE AND OBJECTIVE BOX
Patient is a 70y old  Female who presents with a chief complaint of nausea, diarrhea (23 Mar 2021 09:27)      Subective:  Feels good.    PAST MEDICAL & SURGICAL HISTORY:  Crohn&#x27;s disease        MEDICATIONS  (STANDING):  apixaban 5 milliGRAM(s) Oral every 12 hours  cefTRIAXone Injectable. 2000 milliGRAM(s) IV Push every 24 hours  digoxin     Tablet 250 MICROGram(s) Oral daily  metoprolol tartrate 25 milliGRAM(s) Oral every 8 hours    MEDICATIONS  (PRN):  aluminum hydroxide/magnesium hydroxide/simethicone Suspension 30 milliLiter(s) Oral every 4 hours PRN Dyspepsia  ondansetron Injectable 4 milliGRAM(s) IV Push every 6 hours PRN Nausea      REVIEW OF SYSTEMS:    RESPIRATORY: No shortness of breath  CARDIOVASCULAR: No chest pain  All other review of systems is negative unless indicated above.    Vital Signs Last 24 Hrs  T(C): 36.8 (23 Mar 2021 08:02), Max: 36.8 (23 Mar 2021 08:02)  T(F): 98.3 (23 Mar 2021 08:02), Max: 98.3 (23 Mar 2021 08:02)  HR: 109 (23 Mar 2021 08:02) (79 - 109)  BP: 102/72 (23 Mar 2021 08:02) (102/72 - 132/80)  BP(mean): --  RR: 17 (23 Mar 2021 04:56) (17 - 18)  SpO2: 96% (23 Mar 2021 08:02) (93% - 96%)    PHYSICAL EXAM:    Constitutional: NAD, well-developed  Respiratory: CTAB  Cardiovascular: S1 and S2, RRR  Gastrointestinal: BS+, soft, NT/ND  Extremities: No peripheral edema  Psychiatric: Normal mood, normal affect    LABS:                        13.2   5.62  )-----------( 406      ( 22 Mar 2021 08:04 )             40.1     03-22    139  |  107  |  9   ----------------------------<  95  3.5   |  26  |  0.79    Ca    8.8      22 Mar 2021 08:04  Mg     2.1     03-22            RADIOLOGY & ADDITIONAL STUDIES:

## 2021-03-23 NOTE — PROGRESS NOTE ADULT - ASSESSMENT
Imp:  Liver abscess  RLQ mass, hopefully infections. Biopsies neg for malignancy    Rec:  Cont abs per ID  Surgical f/u  Reconsult prn

## 2021-03-23 NOTE — DISCHARGE NOTE PROVIDER - PROVIDER TOKENS
PROVIDER:[TOKEN:[[id number]],FOLLOWUP:[2 weeks]],PROVIDER:[TOKEN:[[id number]],FOLLOWUP:[1 week]],PROVIDER:[TOKEN:[[id number]],FOLLOWUP:[1 week]]

## 2021-03-23 NOTE — DISCHARGE NOTE PROVIDER - NSDCMRMEDTOKEN_GEN_ALL_CORE_FT
apixaban 5 mg oral tablet: 1 tab(s) orally every 12 hours  cefTRIAXone: 2 gram(s) intravenous once a day  digoxin 125 mcg (0.125 mg) oral tablet: 1 tab(s) orally once a day  metoprolol tartrate 25 mg oral tablet: 1 tab(s) orally every 8 hours  Refresh ophthalmic solution: 1 drop(s) to each affected eye once a day, As Needed - for dry eyes

## 2021-03-23 NOTE — DISCHARGE NOTE PROVIDER - NSDCCPCAREPLAN_GEN_ALL_CORE_FT
PRINCIPAL DISCHARGE DIAGNOSIS  Diagnosis: Severe sepsis  Assessment and Plan of Treatment: due to liver abscess - continue IV antibiotics; see Dr Jacobo as OP for further management for appendectomy at a later date       PRINCIPAL DISCHARGE DIAGNOSIS  Diagnosis: Severe sepsis  Assessment and Plan of Treatment: due to liver abscess - continue IV antibiotics; see Dr Jacobo as OP for further management for appendectomy at a later date      SECONDARY DISCHARGE DIAGNOSES  Diagnosis: Atrial fibrillation  Assessment and Plan of Treatment: please continue metoprolol and digoxin for rate control and eliquis to reduce the risk of stroke

## 2021-03-23 NOTE — ADVANCED PRACTICE NURSE CONSULT - ASSESSMENT
Pt confirmed using two identifiers (name and ). PICC placement procedure discussed with risks and benefits reviewed. All questions answered. Pt signed consent. Chart was reviewed. Pt prepped and draped in sterile fashion. Using strict aseptic technique that was maintained throughout procedure, performed hand hygiene, all team members maintained full barrier precautions, 1% lidocaine used subdermally. Using MST and guided by ultrasound, Navilyst PICC with PASV technology 4Fr, lot# [id number], right basilic vein accessed. PICC trimmed to 40 cm and inserted. Brisk blood return observed and flushed with 30cc NS. Stat lock and gauze applied with dressings, end cap and Curos cap applied. All sharps accounted for and disposed of in sharps container. Pt tolerated procedure well. Minimal blood loss noted. Pt provided with PICC line care information.  CXR performed to confirm placement and no pneumothorax. CXR showed loop within line, PICC flushed very briskly with 100cc of NS and CXR performed again. PICC still shows loop. Pt aware. Order for IR placed. Spoke with IR regarding situation and they will review case. Report given to primary RN

## 2021-03-23 NOTE — PROGRESS NOTE ADULT - SUBJECTIVE AND OBJECTIVE BOX
71 y/o woman with a history of Crohns Disease and lack of regular medical attention admitted on 3/15/21 with new onset AF with possible Crohns' exacerbation vs ischemic colitis and with abdominal CT revealing a soft tissue mass around the right adnexa.    3/16/'21: no complaints. discussed CT scan findings. colonoscopy rescheduled tomorrow K low today. discussed CV pt reluctant to do this.  3/17/21:  No new complaints; occasional palpitations.  3/18/21:  Upset about appendiceal tumor; no cardiac symptoms.  3/19/21:  No new complaints; awaiting plan from surgical teams  3/20/21: Frustrated with illness but feels well - no palpitations, no abdominal pain.    3/21/21:  Eager to return home; feels ok; ambulated in hallway yesterday  3/22/'21: no complaints  3/23/'21: no complaints      MEDICATIONS:  OUTPATIENT  Home Medications:  cefTRIAXone: 2 gram(s) intravenous once a day (23 Mar 2021 13:43)  Refresh ophthalmic solution: 1 drop(s) to each affected eye once a day, As Needed - for dry eyes (15 Mar 2021 14:38)      INPATIENT  MEDICATIONS  (STANDING):  apixaban 5 milliGRAM(s) Oral every 12 hours  cefTRIAXone Injectable. 2000 milliGRAM(s) IV Push every 24 hours  digoxin     Tablet 250 MICROGram(s) Oral daily  metoprolol tartrate 25 milliGRAM(s) Oral every 8 hours    MEDICATIONS  (PRN):  aluminum hydroxide/magnesium hydroxide/simethicone Suspension 30 milliLiter(s) Oral every 4 hours PRN Dyspepsia  ondansetron Injectable 4 milliGRAM(s) IV Push every 6 hours PRN Nausea          Vital Signs Last 24 Hrs  T(C): 36.8 (23 Mar 2021 08:02), Max: 36.8 (23 Mar 2021 08:02)  T(F): 98.3 (23 Mar 2021 08:02), Max: 98.3 (23 Mar 2021 08:02)  HR: 87 (23 Mar 2021 14:39) (79 - 109)  BP: 123/64 (23 Mar 2021 14:39) (102/72 - 123/64)  BP(mean): --  RR: 18 (23 Mar 2021 08:02) (17 - 18)  SpO2: 96% (23 Mar 2021 08:02) (93% - 96%)Daily     Daily I&O's Summary      PHYSICAL EXAM:  Constitutional: NAD, awake and alert, obese  Respiratory: Breath sounds are clear bilaterally  Cardiovascular: Irregular, no murmur  Gastrointestinal: Bowel Sounds present, abdomen is soft, nontender.   psych: Appropriate mood and affect      LABS: All Labs Reviewed:                        13.2   5.62  )-----------( 406      ( 22 Mar 2021 08:04 )             40.1                         13.7   6.28  )-----------( 453      ( 21 Mar 2021 08:04 )             41.3     22 Mar 2021 08:04    139    |  107    |  9      ----------------------------<  95     3.5     |  26     |  0.79   21 Mar 2021 08:04    140    |  108    |  11     ----------------------------<  102    3.6     |  24     |  0.85     Ca    8.8        22 Mar 2021 08:04  Ca    8.9        21 Mar 2021 08:04  Mg     2.1       22 Mar 2021 08:04  Mg     2.2       21 Mar 2021 08:04            Blood Culture: Organism --  Gram Stain Blood -- Gram Stain --  Specimen Source .Blood None  Culture-Blood --    Organism --  Gram Stain Blood -- Gram Stain --  Specimen Source .Blood None  Culture-Blood --    Organism --  Gram Stain Blood -- Gram Stain --  Specimen Source .Abscess liver  Culture-Blood --        12 Lead ECG (03.15.21 @ 12:36):  Atrial fibrillation with rapid ventricular response  ST & T wave abnormality, consider inferior ischemia    CT Abdomen and Pelvis w/ IV Cont (03.15.21 @ 14:27):  Soft tissue mass centered around the right adnexa appears to be emanating from the appendix/cecum and secondarily involving the right adnexa concerning for neoplasm which is likely appendiceal/cecal in origin versus right ovarian. Consideration for colonoscopy for further evaluation. Indeterminate hepatic lesion for which MRI is recommended for further evaluation. Right paracolic gutter peritoneal nodularity for which carcinomatosis cannot be excluded.    TTE Echo Complete w/o Contrast w/ Doppler (03.16.21 @ 10:34):   Endocardium is not well visualized, however, overall left ventricular systolic function appears normal. Estimated left ventricular ejection fraction is 55%.   The left atrium is moderately dilated.    Tele:  AF

## 2021-03-23 NOTE — ADVANCED PRACTICE NURSE CONSULT - ASSESSMENT
Patient is awake and alert. Midline insertion along with risks, benefits, possible complications and infection prevention explained to pt who verbalized understanding. All questions addressed and pt gave verbal consent to place Midline. Daughter was on phone with primary RN at time of Midline placement and was also aware that pt was having Midline placed at this time. Time out along with hand washing by RN completed. Left arm cleansed with CHG and first attempt to place Arrow Endurance 20g/8cm Midline into left basilic vein using sterile technique under ultrasound guidance was unsuccessful. Right arm cleansed with CHG. Using sterile technique, under ultrasound guidance, placed Arrow Endurance 20g/8cm Midline (lot # [id number]) into right basilic vein. Brisk blood return and flushed with 10ml NS. Minimal blood loss and pt tolerated procedure well. All sharps accounted for. Dressing and end cap in place. Report given to district RN.

## 2021-03-23 NOTE — PROGRESS NOTE ADULT - PROBLEM SELECTOR PLAN 2
Liver abscess and Streptococcus anginosus bacteremia; 6 weeks of IV antibiotics planned.
Liver abscess and Streptococcus anginosus bacteremia; 6 weeks of IV antibiotics planned.
Multiple abnormalities on imaging (liver, appendix, adnexa); multispecialty work-up in progress.
Soft tissue mass centered around the right adnexa and possibly arising from appendix/cecum -- GI work-up in progress.
Liver abscess and Streptococcus anginosus bacteremia; 6 weeks of IV antibiotics planned.
Note- echo reports states "RV & RA pacemaker lead".  Reviewed echo images & reviewed CT scan of chest which includes views of the RV & RA w/ radiologist - no metallic fragments on CT & CXR.  "RA & RV lead is likely artifact.
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
Multiple abnormalities on imaging (liver, appendix, adnexa) and now with apparent liver infection and s/p IR drainage

## 2021-03-24 LAB
CULTURE RESULTS: SIGNIFICANT CHANGE UP
SPECIMEN SOURCE: SIGNIFICANT CHANGE UP

## 2021-03-25 LAB
CULTURE RESULTS: SIGNIFICANT CHANGE UP
SPECIMEN SOURCE: SIGNIFICANT CHANGE UP

## 2021-03-26 LAB
CULTURE RESULTS: SIGNIFICANT CHANGE UP
CULTURE RESULTS: SIGNIFICANT CHANGE UP
SPECIMEN SOURCE: SIGNIFICANT CHANGE UP
SPECIMEN SOURCE: SIGNIFICANT CHANGE UP

## 2021-03-30 DIAGNOSIS — K50.10 CROHN'S DISEASE OF LARGE INTESTINE WITHOUT COMPLICATIONS: ICD-10-CM

## 2021-03-30 DIAGNOSIS — E87.6 HYPOKALEMIA: ICD-10-CM

## 2021-03-30 DIAGNOSIS — N17.9 ACUTE KIDNEY FAILURE, UNSPECIFIED: ICD-10-CM

## 2021-03-30 DIAGNOSIS — I48.92 UNSPECIFIED ATRIAL FLUTTER: ICD-10-CM

## 2021-03-30 DIAGNOSIS — K66.8 OTHER SPECIFIED DISORDERS OF PERITONEUM: ICD-10-CM

## 2021-03-30 DIAGNOSIS — E87.1 HYPO-OSMOLALITY AND HYPONATREMIA: ICD-10-CM

## 2021-03-30 DIAGNOSIS — I48.19 OTHER PERSISTENT ATRIAL FIBRILLATION: ICD-10-CM

## 2021-03-30 DIAGNOSIS — A41.1 SEPSIS DUE TO OTHER SPECIFIED STAPHYLOCOCCUS: ICD-10-CM

## 2021-03-30 DIAGNOSIS — K35.33 ACUTE APPENDICITIS WITH PERFORATION, LOCALIZED PERITONITIS, AND GANGRENE, WITH ABSCESS: ICD-10-CM

## 2021-03-30 DIAGNOSIS — K64.8 OTHER HEMORRHOIDS: ICD-10-CM

## 2021-03-30 DIAGNOSIS — Z23 ENCOUNTER FOR IMMUNIZATION: ICD-10-CM

## 2021-03-30 DIAGNOSIS — E86.0 DEHYDRATION: ICD-10-CM

## 2021-03-30 DIAGNOSIS — R65.20 SEVERE SEPSIS WITHOUT SEPTIC SHOCK: ICD-10-CM

## 2021-03-30 DIAGNOSIS — R94.31 ABNORMAL ELECTROCARDIOGRAM [ECG] [EKG]: ICD-10-CM

## 2021-03-30 DIAGNOSIS — K75.0 ABSCESS OF LIVER: ICD-10-CM

## 2021-03-30 DIAGNOSIS — K76.89 OTHER SPECIFIED DISEASES OF LIVER: ICD-10-CM

## 2021-04-05 PROBLEM — K50.90 CROHN'S DISEASE, UNSPECIFIED, WITHOUT COMPLICATIONS: Chronic | Status: ACTIVE | Noted: 2021-03-15

## 2021-04-06 PROBLEM — Z00.00 ENCOUNTER FOR PREVENTIVE HEALTH EXAMINATION: Status: ACTIVE | Noted: 2021-04-06

## 2021-04-09 ENCOUNTER — NON-APPOINTMENT (OUTPATIENT)
Age: 71
End: 2021-04-09

## 2021-04-09 ENCOUNTER — APPOINTMENT (OUTPATIENT)
Dept: CARDIOLOGY | Facility: CLINIC | Age: 71
End: 2021-04-09
Payer: MEDICARE

## 2021-04-09 VITALS
BODY MASS INDEX: 44.39 KG/M2 | SYSTOLIC BLOOD PRESSURE: 161 MMHG | HEIGHT: 64 IN | DIASTOLIC BLOOD PRESSURE: 87 MMHG | HEART RATE: 76 BPM | WEIGHT: 260 LBS | OXYGEN SATURATION: 98 %

## 2021-04-09 VITALS — SYSTOLIC BLOOD PRESSURE: 138 MMHG | DIASTOLIC BLOOD PRESSURE: 84 MMHG

## 2021-04-09 DIAGNOSIS — Z87.891 PERSONAL HISTORY OF NICOTINE DEPENDENCE: ICD-10-CM

## 2021-04-09 PROCEDURE — 93000 ELECTROCARDIOGRAM COMPLETE: CPT

## 2021-04-09 PROCEDURE — 99214 OFFICE O/P EST MOD 30 MIN: CPT

## 2021-04-09 RX ORDER — DIGOXIN 125 MCG
0.12 TABLET ORAL DAILY
Refills: 0 | Status: DISCONTINUED | COMMUNITY
End: 2021-04-09

## 2021-04-09 NOTE — HISTORY OF PRESENT ILLNESS
[FreeTextEntry1] : Diana Ng is a 70-year-old woman with a history of Crohn's disease and the lack of regular medical attention who was admitted to [facility] on March 15, 2021 with newly diagnosed atrial fibrillation and liver abscess with Streptococcus anginosus bacteremia (requiring a 6 week course of IV antibiotic [ceftriaxone]).  She was reluctant to undergo an attempt to restore sinus rhythm with a cardioversion and a rate control + anticoagulation strategy was pursued.   Cardiac medications upon discharge (3/23/21): Eliquis 5 mg b.i.d., digoxin 0.125 mg daily, metoprolol tartrate 25 mg Q8 hours.  She has been doing well from a cardiovascular standpoint since hospital discharge - tolerating anticoagulation; no palpitations, dyspnea, or chest discomfort.

## 2021-04-09 NOTE — REVIEW OF SYSTEMS
[Fever] : no fever [Chills] : no chills [Shortness Of Breath] : no shortness of breath [Chest Pain] : no chest pain [Palpitations] : no palpitations

## 2021-04-09 NOTE — PHYSICAL EXAM
[Eyelids - No Xanthelasma] : the eyelids demonstrated no xanthelasmas [] : no respiratory distress [Respiration, Rhythm And Depth] : normal respiratory rhythm and effort [Auscultation Breath Sounds / Voice Sounds] : lungs were clear to auscultation bilaterally [FreeTextEntry1] : Right arm PICC line [Skin Color & Pigmentation] : normal skin color and pigmentation [Oriented To Time, Place, And Person] : oriented to person, place, and time [Impaired Insight] : insight and judgment were intact [Affect] : the affect was normal [Mood] : the mood was normal

## 2021-04-09 NOTE — DISCUSSION/SUMMARY
[FreeTextEntry1] : \par Atrial fibrillation: Persistent - chronicity is unclear because she has not had much medical care; continue rate control / anticoagulation strategy with metoprolol and Eliquis; change metoprolol to 50 mg b.i.d. and discontinue digoxin.\par \par Hypertension: Blood pressure mildly elevated; beta blocker dose increased from 75 mg to 100 mg (total daily dose).\par \par Bacteremia due to Streptococcus:  Patient has completed 3/6 weeks of IV antibiotic therapy; she says that she will be following up with the colorectal surgery service.

## 2021-04-09 NOTE — REASON FOR VISIT
[Follow-Up - From Hospitalization] : follow-up of a recent hospitalization for [Atrial Fibrillation] : atrial fibrillation [Medication Management] : Medication management [Spouse] : spouse

## 2021-05-06 ENCOUNTER — APPOINTMENT (OUTPATIENT)
Dept: COLORECTAL SURGERY | Facility: CLINIC | Age: 71
End: 2021-05-06
Payer: MEDICARE

## 2021-05-06 VITALS — DIASTOLIC BLOOD PRESSURE: 98 MMHG | HEART RATE: 88 BPM | SYSTOLIC BLOOD PRESSURE: 173 MMHG | OXYGEN SATURATION: 96 %

## 2021-05-06 DIAGNOSIS — K75.0 ABSCESS OF LIVER: ICD-10-CM

## 2021-05-06 DIAGNOSIS — B95.5 BACTEREMIA: ICD-10-CM

## 2021-05-06 DIAGNOSIS — K35.33 ACUTE APPENDICITIS W/ PERFORATION AND LOCALIZED PERITONITIS, W/ABSCESS: ICD-10-CM

## 2021-05-06 DIAGNOSIS — K82.8 OTHER SPECIFIED DISEASES OF GALLBLADDER: ICD-10-CM

## 2021-05-06 DIAGNOSIS — R78.81 BACTEREMIA: ICD-10-CM

## 2021-05-06 PROCEDURE — 99214 OFFICE O/P EST MOD 30 MIN: CPT

## 2021-05-06 NOTE — REASON FOR VISIT
[Post Hospitalization] : a post hospitalization visit [FreeTextEntry1] : 3/15-3/23/21 for appendiceal perforation with phelgmon and resultant liver abscess

## 2021-05-06 NOTE — ASSESSMENT
[FreeTextEntry1] : Ms. Ng presents to the office for follow-up after being hospitalized at [facility] from 3/15 to 3/23/2021 for contained perforated appendicitis with pyogenic abscess. I briefly reviewed her hospital course with the patient and her  including the pathophysiology of disease and infection.  We also reviewed the CT scan findings from the day of admission. \par I have ordered a follow-up CT scan to ensure resolution of infection and will keep him apprised of results.  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 She was also advised on the need for laparoscopic appendectomy, possible ileocecectomy, to remove the nidus of infection.  The risks/benefits/alternatives for the procedure was reviewed.  As I do not have privileges for cholecystectomy, I will plan to have another MIS surgeon available for the gallbladder.  Duration of hospitalization, preparation needed for surgery including oral and mechanical bowel prep, as well as holding Eliquis 2 days in advance were reviewed.  She will need cardiac clearance as well.  They understand, and are agreeable to proceed.

## 2021-05-06 NOTE — HISTORY OF PRESENT ILLNESS
[FreeTextEntry1] : Ms. Ng presents to the office for followup after recent hospitalization at  from 3/15-3/23/21. She was admitted with feeling unwell, found to be in A fib with RVR. CT A/P demonstrated a mass in ICV region with ? 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 Colonoscopy revealed mass at level of ICV with biopsies demonstrating inflammatory tissue. Biopsy of the liver lesion revealed purulence with aspiration of purulent contents. It was then presumed that patient had perforated appendix with resultant phlegmon and pyogenic abscess. She was placed on 6 week course of antibiotics, finished on 5/1/21 and is now here for followup.  She denies any abdominal pain and otherwise feels well.  She has not had a follow-up CT scan since completion of IV antibiotics.

## 2021-05-06 NOTE — PHYSICAL EXAM
[No Rash or Lesion] : No rash or lesion [Alert] : alert [Oriented to Person] : oriented to person [Oriented to Place] : oriented to place [Oriented to Time] : oriented to time [Calm] : calm [de-identified] : Obese, soft, nontender and nondistended [de-identified] : No apparent distress [de-identified] : Normocephalic atraumatic [de-identified] : Moving all extremities x4

## 2021-05-18 ENCOUNTER — APPOINTMENT (OUTPATIENT)
Dept: FAMILY MEDICINE | Facility: CLINIC | Age: 71
End: 2021-05-18
Payer: MEDICARE

## 2021-05-18 VITALS
HEART RATE: 78 BPM | SYSTOLIC BLOOD PRESSURE: 124 MMHG | DIASTOLIC BLOOD PRESSURE: 80 MMHG | WEIGHT: 272 LBS | BODY MASS INDEX: 46.44 KG/M2 | TEMPERATURE: 96.4 F | OXYGEN SATURATION: 96 % | HEIGHT: 64 IN

## 2021-05-18 DIAGNOSIS — R21 RASH AND OTHER NONSPECIFIC SKIN ERUPTION: ICD-10-CM

## 2021-05-18 PROCEDURE — 99204 OFFICE O/P NEW MOD 45 MIN: CPT

## 2021-05-18 RX ORDER — METRONIDAZOLE 500 MG/1
500 TABLET ORAL
Qty: 3 | Refills: 0 | Status: DISCONTINUED | COMMUNITY
Start: 2021-05-06 | End: 2021-05-18

## 2021-05-18 NOTE — PHYSICAL EXAM
[Normal] : affect was normal and insight and judgment were intact [de-identified] : obese  [de-identified] : slowed gait , use of cane  [de-identified] : pink /red maculopapular rash noted bilateral axilla and inner thighs bilaterally

## 2021-05-18 NOTE — PLAN
[FreeTextEntry1] : \par \par Rash appears to be fungal , most likely  secondary to prolonged abx use . Due to obesity and being sedentary ,excessive sweating may also exacerbate fungal rash. Advised pt against cortisone use . Rash also has red appearance , this may be secondary to scratching and being on Eliquis tx.\par I have advised Nystatin cream and powder . I have also advised patient discuss this with her surgeon , as she is scheduled for elective cholecystectomy.

## 2021-05-18 NOTE — HISTORY OF PRESENT ILLNESS
[FreeTextEntry8] : Patient was hospitalized for appendicitis with subsequent intra abdominal infection, appendectomy was not recommended , she was hospitalized for 10 days , with IV abx and discharged with a pic line and had abx (ceftriaxone) for several additional weeks.  She recently  developed erythematous  pruritic rash on inner thighs and axilla bilaterally. the last 1-2 weeks she has been applying topical OTC cortisone cream , this has helped with the pruritis , however rash remains. \par Patient was also dxd with Afib while hospitalized  and  she was discharged on eliqus mg 2 x day , and metoprolol tartarate 50mg 2 x daily.\par

## 2021-06-08 ENCOUNTER — APPOINTMENT (OUTPATIENT)
Dept: INTERNAL MEDICINE | Facility: CLINIC | Age: 71
End: 2021-06-08

## 2021-06-18 ENCOUNTER — NON-APPOINTMENT (OUTPATIENT)
Age: 71
End: 2021-06-18

## 2021-06-18 ENCOUNTER — APPOINTMENT (OUTPATIENT)
Dept: CARDIOLOGY | Facility: CLINIC | Age: 71
End: 2021-06-18
Payer: MEDICARE

## 2021-06-18 VITALS — SYSTOLIC BLOOD PRESSURE: 140 MMHG | DIASTOLIC BLOOD PRESSURE: 84 MMHG

## 2021-06-18 VITALS
SYSTOLIC BLOOD PRESSURE: 163 MMHG | WEIGHT: 269 LBS | HEART RATE: 71 BPM | OXYGEN SATURATION: 99 % | DIASTOLIC BLOOD PRESSURE: 109 MMHG | BODY MASS INDEX: 45.93 KG/M2 | HEIGHT: 64 IN

## 2021-06-18 DIAGNOSIS — Z01.810 ENCOUNTER FOR PREPROCEDURAL CARDIOVASCULAR EXAMINATION: ICD-10-CM

## 2021-06-18 PROCEDURE — 93000 ELECTROCARDIOGRAM COMPLETE: CPT

## 2021-06-18 PROCEDURE — 99214 OFFICE O/P EST MOD 30 MIN: CPT

## 2021-06-18 NOTE — REASON FOR VISIT
[Atrial Fibrillation] : atrial fibrillation [Medication Management] : Medication management [Spouse] : spouse [Follow-Up - Clinic] : a clinic follow-up of

## 2021-06-18 NOTE — HISTORY OF PRESENT ILLNESS
[FreeTextEntry1] : Diana Ng is a 70-year-old woman with a history of Crohn's disease and the lack of regular medical attention who was admitted to [facility] on March 15, 2021 with newly diagnosed atrial fibrillation and liver abscess with Streptococcus anginosus bacteremia (requiring a 6 week course of IV antibiotic [ceftriaxone]). She was reluctant to undergo an attempt to restore sinus rhythm with a cardioversion and a rate control + anticoagulation strategy was pursued. During our last encounter in April 2021 her blood pressure was mildly elevated and I increased metoprolol to 50 mg b.i.d. and discontinued digoxin.  She has been doing well from a cardiovascular standpoint - tolerating prescribed pharmacotherapy and not experiencing chest discomfort or dyspnea.  She is anticipating abdominal surgery in the upcoming one to 2 months (waiting to have a CT performed and for a fungal skin rash to resolve).  She has no new cardiovascular complaints during today's visit.

## 2021-06-18 NOTE — PHYSICAL EXAM
[Eyelids - No Xanthelasma] : the eyelids demonstrated no xanthelasmas [] : no respiratory distress [Respiration, Rhythm And Depth] : normal respiratory rhythm and effort [Auscultation Breath Sounds / Voice Sounds] : lungs were clear to auscultation bilaterally [Skin Color & Pigmentation] : normal skin color and pigmentation [Oriented To Time, Place, And Person] : oriented to person, place, and time [Impaired Insight] : insight and judgment were intact [Affect] : the affect was normal [Mood] : the mood was normal [Edema] : no peripheral edema present [Nail Clubbing] : no clubbing of the fingernails [Cyanosis, Localized] : no localized cyanosis [FreeTextEntry1] : Central obesity

## 2021-06-18 NOTE — REVIEW OF SYSTEMS
[Rash] : rash [Change In Color Of Skin] : change in skin color [Skin Lesions] : skin lesion(s): [Under Stress] : under stress [SOB] : no shortness of breath [Dyspnea on exertion] : not dyspnea during exertion [Chest Discomfort] : no chest discomfort [Palpitations] : no palpitations

## 2021-08-07 ENCOUNTER — RX RENEWAL (OUTPATIENT)
Age: 71
End: 2021-08-07

## 2021-11-11 ENCOUNTER — NON-APPOINTMENT (OUTPATIENT)
Age: 71
End: 2021-11-11

## 2021-11-12 ENCOUNTER — APPOINTMENT (OUTPATIENT)
Dept: INTERNAL MEDICINE | Facility: CLINIC | Age: 71
End: 2021-11-12
Payer: MEDICARE

## 2021-11-12 ENCOUNTER — NON-APPOINTMENT (OUTPATIENT)
Age: 71
End: 2021-11-12

## 2021-11-12 VITALS
OXYGEN SATURATION: 98 % | HEART RATE: 91 BPM | SYSTOLIC BLOOD PRESSURE: 180 MMHG | DIASTOLIC BLOOD PRESSURE: 102 MMHG | TEMPERATURE: 98.9 F | HEIGHT: 64 IN | WEIGHT: 290 LBS | BODY MASS INDEX: 49.51 KG/M2

## 2021-11-12 VITALS — SYSTOLIC BLOOD PRESSURE: 140 MMHG | DIASTOLIC BLOOD PRESSURE: 88 MMHG

## 2021-11-12 DIAGNOSIS — M17.10 UNILATERAL PRIMARY OSTEOARTHRITIS, UNSPECIFIED KNEE: ICD-10-CM

## 2021-11-12 DIAGNOSIS — R10.30 LOWER ABDOMINAL PAIN, UNSPECIFIED: ICD-10-CM

## 2021-11-12 DIAGNOSIS — M79.651 PAIN IN RIGHT THIGH: ICD-10-CM

## 2021-11-12 PROCEDURE — 99204 OFFICE O/P NEW MOD 45 MIN: CPT | Mod: 25

## 2021-11-12 PROCEDURE — G0444 DEPRESSION SCREEN ANNUAL: CPT | Mod: 59

## 2021-11-12 RX ORDER — ACETAMINOPHEN 325 MG/1
TABLET, FILM COATED ORAL
Refills: 0 | Status: ACTIVE | COMMUNITY

## 2021-11-12 RX ORDER — NYSTATIN 100000 [USP'U]/G
100000 CREAM TOPICAL 3 TIMES DAILY
Qty: 2 | Refills: 3 | Status: DISCONTINUED | COMMUNITY
Start: 2021-05-18 | End: 2021-11-12

## 2021-11-12 RX ORDER — NYSTATIN 100000 1/G
100000 POWDER TOPICAL
Qty: 1 | Refills: 2 | Status: DISCONTINUED | COMMUNITY
Start: 2021-05-18 | End: 2021-11-12

## 2021-11-12 RX ORDER — NEOMYCIN SULFATE 500 MG/1
500 TABLET ORAL
Qty: 6 | Refills: 0 | Status: DISCONTINUED | COMMUNITY
Start: 2021-05-06 | End: 2021-11-12

## 2021-11-12 RX ORDER — IBUPROFEN 200 MG/1
TABLET, COATED ORAL
Refills: 0 | Status: ACTIVE | COMMUNITY

## 2021-11-12 NOTE — HEALTH RISK ASSESSMENT
[Yes] : Yes [4 or more  times a week (4 pts)] : 4 or more  times a week (4 points) [3 or 4 (1 pt)] : 3 or 4  (1 point) [Monthly (2 pts)] : Monthly (2 points) [No] : In the past 12 months have you used drugs other than those required for medical reasons? No [0] : 2) Feeling down, depressed, or hopeless: Not at all (0) [Patient reported colonoscopy was normal] : Patient reported colonoscopy was normal [PHQ-2 Negative - No further assessment needed] : PHQ-2 Negative - No further assessment needed [I have developed a follow-up plan documented below in the note.] : I have developed a follow-up plan documented below in the note. [] : No [HNA1Hituh] : 0 [ColonoscopyDate] : 03/2021

## 2021-11-12 NOTE — ASSESSMENT
[FreeTextEntry1] : 1.right thigh and groin pain: Discussed obtaining right lower extremity venous duplex to rule out DVT or hematoma as patient is on Eliquis. Bruising noted on exam the patient notes no injury or trauma recently. Discussed possibility of right hip osteoarthritis and to obtain right hip x-ray. Patient asked for pain medications on multiple occasions including Vicodin. Advise patient that I would not be treating this with narcotic medications is not post surgery or with severe pain. Given prescription for Medrol Dosepak and tizanidine for pain relief. Advised patient to get lidocaine patch over-the-counter as well.\par Went over instructions and side effects of medication.\par \par 2.atrial fibrillation: Continue anticoagulation metoprolol and follow up with cardiology.\par \par 3.morbid obesity: Patient does not wish to consider bariatric surgery and notes giving up on weight loss, counseled on risks of morbid obesity and advised dietary and lifestyle changes.\par \par 4.hypertension: Not well controlled. Continue on metoprolol 50 mg twice a day, follow up with cardiology, does not wish to be on more medication, discussed the risks of uncontrolled hypertension.\par Check blood pressure daily, if greater than 150/90, call MD. Keep 2 gm low sodium diet, exercise as tolerated.\par \par 5.Crohn's disease: Recent hospitalization records reviewed.

## 2021-11-12 NOTE — HISTORY OF PRESENT ILLNESS
[FreeTextEntry1] : Patient comes in to establish care.\par  [de-identified] : ANIL PEREZ is a 71 year F who comes in to [facility] care.\par Patient has a history of atrial fibrillation, hypertension and morbid obesity. She's not had a primary care physician in the past. She was admitted to the hospital in March for her appendiceal perforation with liver abscess status post drainage and IV antibiotics as well as new onset atrial fibrillation.\par Pt notes pain in the right upper inner thigh for the past 2-3 weeks. She has not had fall/trauma but does sleep on that side. She has been taking Advil 5 tabs and 2 Tylenol with pain mildly relieved. She asked for pain medication multiple times. \par On Monday she was dx with macular degeneration of left eye and saw vitreoretinal consultants and had procedure on Monday.

## 2021-11-19 ENCOUNTER — APPOINTMENT (OUTPATIENT)
Dept: RADIOLOGY | Facility: CLINIC | Age: 71
End: 2021-11-19
Payer: MEDICARE

## 2021-11-19 ENCOUNTER — OUTPATIENT (OUTPATIENT)
Dept: OUTPATIENT SERVICES | Facility: HOSPITAL | Age: 71
LOS: 1 days | End: 2021-11-19
Payer: MEDICARE

## 2021-11-19 ENCOUNTER — APPOINTMENT (OUTPATIENT)
Dept: ULTRASOUND IMAGING | Facility: CLINIC | Age: 71
End: 2021-11-19
Payer: MEDICARE

## 2021-11-19 DIAGNOSIS — M79.651 PAIN IN RIGHT THIGH: ICD-10-CM

## 2021-11-19 DIAGNOSIS — R10.30 LOWER ABDOMINAL PAIN, UNSPECIFIED: ICD-10-CM

## 2021-11-19 PROCEDURE — 73502 X-RAY EXAM HIP UNI 2-3 VIEWS: CPT | Mod: 26,RT

## 2021-11-19 PROCEDURE — 93971 EXTREMITY STUDY: CPT

## 2021-11-19 PROCEDURE — 93971 EXTREMITY STUDY: CPT | Mod: 26,RT

## 2021-11-19 PROCEDURE — 73502 X-RAY EXAM HIP UNI 2-3 VIEWS: CPT

## 2021-11-22 ENCOUNTER — NON-APPOINTMENT (OUTPATIENT)
Age: 71
End: 2021-11-22

## 2022-01-19 ENCOUNTER — APPOINTMENT (OUTPATIENT)
Dept: ORTHOPEDIC SURGERY | Facility: CLINIC | Age: 72
End: 2022-01-19

## 2022-02-15 ENCOUNTER — FORM ENCOUNTER (OUTPATIENT)
Age: 72
End: 2022-02-15

## 2022-04-04 ENCOUNTER — APPOINTMENT (OUTPATIENT)
Dept: RHEUMATOLOGY | Facility: CLINIC | Age: 72
End: 2022-04-04
Payer: MEDICARE

## 2022-04-04 ENCOUNTER — NON-APPOINTMENT (OUTPATIENT)
Age: 72
End: 2022-04-04

## 2022-04-04 VITALS
BODY MASS INDEX: 50.02 KG/M2 | HEART RATE: 74 BPM | HEIGHT: 64 IN | OXYGEN SATURATION: 97 % | SYSTOLIC BLOOD PRESSURE: 120 MMHG | DIASTOLIC BLOOD PRESSURE: 80 MMHG | WEIGHT: 293 LBS | TEMPERATURE: 97.3 F

## 2022-04-04 DIAGNOSIS — M16.11 UNILATERAL PRIMARY OSTEOARTHRITIS, RIGHT HIP: ICD-10-CM

## 2022-04-04 DIAGNOSIS — M25.551 PAIN IN RIGHT HIP: ICD-10-CM

## 2022-04-04 DIAGNOSIS — K50.90 CROHN'S DISEASE, UNSPECIFIED, W/OUT COMPLICATIONS: ICD-10-CM

## 2022-04-04 PROCEDURE — 99204 OFFICE O/P NEW MOD 45 MIN: CPT

## 2022-04-06 PROBLEM — K50.90 CROHN DISEASE: Status: ACTIVE | Noted: 2021-04-09

## 2022-04-06 NOTE — ASSESSMENT
[FreeTextEntry1] : ANIL PEREZ is a 71 year old woman who presents with progressive R sided hip pain since Nov, making ambulation harder and creating some thigh and knee pain due to change in mechanics. \par \par - image reviewed with her\par - recommend US guided hip injection followed by PT \par - advised pt given one joint would not recommend systemic therapy at present but if not responsive to above and/or worsening knee pain we can consider trial of Cymbalta\par - prn Tylenol ES up to 2 tabs BID\par - sparing use of NSAIDs for breakthru pain given age and IBD comorbidity  \par - presently is not consistent with IBD related inflammatory arthritis \par - RTC in 6 weeks

## 2022-04-06 NOTE — PHYSICAL EXAM
[General Appearance - Alert] : alert [General Appearance - In No Acute Distress] : in no acute distress [Sclera] : the sclera and conjunctiva were normal [PERRL With Normal Accommodation] : pupils were equal in size, round, and reactive to light [Extraocular Movements] : extraocular movements were intact [Oropharynx] : the oropharynx was normal [Neck Appearance] : the appearance of the neck was normal [Auscultation Breath Sounds / Voice Sounds] : lungs were clear to auscultation bilaterally [Heart Rate And Rhythm] : heart rate was normal and rhythm regular [Heart Sounds] : normal S1 and S2 [Edema] : there was no peripheral edema [Abdomen Soft] : soft [No Spinal Tenderness] : no spinal tenderness [Nail Clubbing] : no clubbing  or cyanosis of the fingernails [Musculoskeletal - Swelling] : no joint swelling seen [Motor Tone] : muscle strength and tone were normal [FreeTextEntry1] : ambulates with cane, antalgic gait and difficulty arising from seated, pain in R hip with log roll, Oa changes in b/l  knees but no pain. No effusions or synovitis  [] : no rash [No Focal Deficits] : no focal deficits

## 2022-04-06 NOTE — HISTORY OF PRESENT ILLNESS
[FreeTextEntry1] : ANIL PEREZ is a 71 year old woman who presents with R hip pain progressive since Nov 2021. No preceding trauma or fall, currently making it hard to arise from seated tho can then ambulate with only mild pain, pain with changing position in bed as well. Creating some thigh and knee pain due to change in mechanics. L side unaffected, no other worsening joint pain over same time period. Was referred for PT but has not started yet.  \par \par + known OA in b/l knees since 2014 \par + Chrons dz -- overall mild, dz 2009, not currently on any meds \par \par Inflammatory arthritis ROS negative for symmetrical peripheral joint synovitis, prolonged AM stiffness, enthesitis, dactylitis, psoriasis/ rashes, eye inflammation, inflammatory low back pain. \par \par SLE ROS negative for alopecia, sicca, salivary gland swelling, oral ulcers, malar rash, photosensitivity, SOB, chest pain, serositis, abd pain, dysuria, hematuria, rash, joint AM stiffness/synovitis, hematologic abnormalities, Raynauds. \par \par Negative FH for autoimmune d/o \par \par XR hip - mild OA

## 2022-05-23 ENCOUNTER — APPOINTMENT (OUTPATIENT)
Dept: RHEUMATOLOGY | Facility: CLINIC | Age: 72
End: 2022-05-23

## 2022-08-14 ENCOUNTER — FORM ENCOUNTER (OUTPATIENT)
Age: 72
End: 2022-08-14

## 2022-09-07 ENCOUNTER — RX RENEWAL (OUTPATIENT)
Age: 72
End: 2022-09-07

## 2022-12-09 ENCOUNTER — RX RENEWAL (OUTPATIENT)
Age: 72
End: 2022-12-09

## 2023-01-18 ENCOUNTER — NON-APPOINTMENT (OUTPATIENT)
Age: 73
End: 2023-01-18

## 2023-01-18 ENCOUNTER — APPOINTMENT (OUTPATIENT)
Dept: CARDIOLOGY | Facility: CLINIC | Age: 73
End: 2023-01-18
Payer: MEDICARE

## 2023-01-18 VITALS
SYSTOLIC BLOOD PRESSURE: 170 MMHG | DIASTOLIC BLOOD PRESSURE: 110 MMHG | HEART RATE: 81 BPM | BODY MASS INDEX: 53.04 KG/M2 | OXYGEN SATURATION: 96 % | WEIGHT: 293 LBS

## 2023-01-18 VITALS — SYSTOLIC BLOOD PRESSURE: 146 MMHG | DIASTOLIC BLOOD PRESSURE: 90 MMHG

## 2023-01-18 DIAGNOSIS — E66.01 MORBID (SEVERE) OBESITY DUE TO EXCESS CALORIES: ICD-10-CM

## 2023-01-18 DIAGNOSIS — R73.9 HYPERGLYCEMIA, UNSPECIFIED: ICD-10-CM

## 2023-01-18 DIAGNOSIS — Z13.220 ENCOUNTER FOR SCREENING FOR LIPOID DISORDERS: ICD-10-CM

## 2023-01-18 DIAGNOSIS — I10 ESSENTIAL (PRIMARY) HYPERTENSION: ICD-10-CM

## 2023-01-18 DIAGNOSIS — I48.91 UNSPECIFIED ATRIAL FIBRILLATION: ICD-10-CM

## 2023-01-18 PROCEDURE — 99214 OFFICE O/P EST MOD 30 MIN: CPT

## 2023-01-18 PROCEDURE — 93000 ELECTROCARDIOGRAM COMPLETE: CPT

## 2023-01-18 RX ORDER — METHYLPREDNISOLONE 4 MG/1
4 TABLET ORAL
Qty: 1 | Refills: 0 | Status: COMPLETED | COMMUNITY
Start: 2021-11-12 | End: 2023-01-18

## 2023-01-18 RX ORDER — TIZANIDINE 2 MG/1
2 TABLET ORAL
Qty: 30 | Refills: 0 | Status: COMPLETED | COMMUNITY
Start: 2021-11-12 | End: 2023-01-18

## 2023-01-18 NOTE — HISTORY OF PRESENT ILLNESS
[FreeTextEntry1] : Diana Ng is a 72-year-old woman with a history of Crohn's disease, atrial fibrillation, liver abscess with Streptococcus anginosus bacteremia (treated with a 6 week course of IV ceftriaxone in Spring 2021) who returns for cardiac examination after a long absence–last seen in June 2021.  She has no new cardiovascular symptoms and only complains about pain -- diffuse and predominantly involving joints.  She has been doing well from a cardiac standpoint–occasional palpitations but infrequent and mild; no chest pain or difficulty breathing.  She has been tolerating anticoagulation.  She is very sedentary–rarely leaves her home -  tells me this is the first time she went outdoors in the past 2 months.

## 2023-01-18 NOTE — CARDIOLOGY SUMMARY
[de-identified] : 1/18/2023.  Atrial fibrillation  [de-identified] : 3/16/2021.  Endocardium not well visualized; grossly normal left ventricular size and function with estimated ejection fraction 55%.  Moderate left atrial enlargement.

## 2023-01-18 NOTE — REVIEW OF SYSTEMS
[Weight Gain (___ Lbs)] : [unfilled] ~Ulb weight gain [SOB] : no shortness of breath [Chest Discomfort] : no chest discomfort [Palpitations] : palpitations [Syncope] : no syncope [Joint Pain] : joint pain [Joint Stiffness] : joint stiffness [Anxiety] : anxiety [Under Stress] : under stress

## 2023-01-18 NOTE — PHYSICAL EXAM
[Clear Lung Fields] : clear lung fields [Moves all extremities] : moves all extremities [Normal Speech] : normal speech [Alert and Oriented] : alert and oriented [de-identified] :   Severe obesity with BMI greater than 50 [de-identified] :  extraocular  muscles intact [de-identified] :  wearing a facemask [de-identified] :  irregularly irregular, normal rate [de-identified] :  multiple lesions on extremities patient attributes to "picking" at her skin

## 2023-01-18 NOTE — DISCUSSION/SUMMARY
[FreeTextEntry1] : \par Atrial fibrillation: Permanent - satisfactory rate control;  continue metoprolol and Eliquis;  check TSH.\par \par Hypertension: suboptimal control–would like to add an ACE inhibitor or ARB but will need laboratory data first–comprehensive metabolic panel ordered; I will contact patient to discuss results and prescribe appropriate drug in an effort to optimize blood pressure.\par \par Hyperglycemia: Check hemoglobin A1c given past hyperglycemia and risks for diabetes mellitus.\par \par Screening for hypercholesterolemia: Check lipid panel.\par

## 2023-01-29 ENCOUNTER — FORM ENCOUNTER (OUTPATIENT)
Age: 73
End: 2023-01-29

## 2023-02-04 ENCOUNTER — RX RENEWAL (OUTPATIENT)
Age: 73
End: 2023-02-04

## 2023-04-23 ENCOUNTER — FORM ENCOUNTER (OUTPATIENT)
Age: 73
End: 2023-04-23

## 2023-06-24 ENCOUNTER — RX RENEWAL (OUTPATIENT)
Age: 73
End: 2023-06-24

## 2023-07-16 ENCOUNTER — FORM ENCOUNTER (OUTPATIENT)
Age: 73
End: 2023-07-16

## 2023-08-04 ENCOUNTER — RX RENEWAL (OUTPATIENT)
Age: 73
End: 2023-08-04

## 2023-08-30 NOTE — PROGRESS NOTE ADULT - SUBJECTIVE AND OBJECTIVE BOX
REASON FOR VISIT:  AF    HPI:  71 y/o woman with a history of Crohns Disease and lack of regular medical attention admitted on 3/15/21 with new onset AF with possible Crohns' exacerbation vs ischemic colitis and with abdominal CT revealing a soft tissue mass around the right adnexa.    3/16/'21: no complaints. discussed CT scan findings. colonoscopy rescheduled tomorrow K low today. discussed CV pt reluctant to do this.  3/17/21:  No new complaints; occasional palpitations.  3/18/21:  Upset about appendiceal tumor; no cardiac symptoms.    MEDICATIONS  (STANDING):  metoprolol tartrate 12.5 milliGRAM(s) Oral every 8 hours    MEDICATIONS  (PRN):  aluminum hydroxide/magnesium hydroxide/simethicone Suspension 30 milliLiter(s) Oral every 4 hours PRN Dyspepsia  ondansetron Injectable 4 milliGRAM(s) IV Push every 6 hours PRN Nausea    Vital Signs Last 24 Hrs  T(C): 36.8 (18 Mar 2021 07:27), Max: 37.2 (18 Mar 2021 05:25)  T(F): 98.2 (18 Mar 2021 07:27), Max: 98.9 (18 Mar 2021 05:25)  HR: 97 (18 Mar 2021 07:27) (92 - 115)  BP: 133/83 (18 Mar 2021 07:27) (114/68 - 133/83)  RR: 18 (18 Mar 2021 07:27) (18 - 18)  SpO2: 96% (18 Mar 2021 07:27) (93% - 96%)    PHYSICAL EXAM:  Constitutional: NAD, awake and alert, obese  Respiratory: Breath sounds are clear bilaterally  Cardiovascular: Irregular, normal rate at time of exam  Gastrointestinal: Bowel Sounds present, abdomen is soft, nontender.   Extremities: No edema.    LABS:                        12.0   6.98  )-----------( 348      ( 17 Mar 2021 06:37 )             36.1     143  |  113<H>  |  10  ----------------------------<  84  4.1   |  22  |  0.62    Ca    8.2<L>      17 Mar 2021 06:37    TPro  6.3  /  Alb  1.9<L>  /  TBili  0.5  /  DBili  x   /  AST  86<H>  /  ALT  52  /  AlkPhos  69  03-17    12 Lead ECG (03.15.21 @ 12:36):  Atrial fibrillation with rapid ventricular response  ST & T wave abnormality, consider inferior ischemia    CT Abdomen and Pelvis w/ IV Cont (03.15.21 @ 14:27):  Soft tissue mass centered around the right adnexa appears to be emanating from the appendix/cecum and secondarily involving the right adnexa concerning for neoplasm which is likely appendiceal/cecal in origin versus right ovarian. Consideration for colonoscopy for further evaluation. Indeterminate hepatic lesion for which MRI is recommended for further evaluation. Right paracolic gutter peritoneal nodularity for which carcinomatosis cannot be excluded.    TTE Echo Complete w/o Contrast w/ Doppler (03.16.21 @ 10:34):   Endocardium is not well visualized, however, overall left ventricular systolic function appears normal. Estimated left ventricular ejection fraction is 55%.   The left atrium is moderately dilated.    Tele:  AF   Doxycycline Pregnancy And Lactation Text: This medication is Pregnancy Category D and not consider safe during pregnancy. It is also excreted in breast milk but is considered safe for shorter treatment courses.

## 2023-09-18 ENCOUNTER — RX CHANGE (OUTPATIENT)
Age: 73
End: 2023-09-18

## 2023-11-06 ENCOUNTER — RX RENEWAL (OUTPATIENT)
Age: 73
End: 2023-11-06

## 2023-11-06 RX ORDER — APIXABAN 5 MG/1
5 TABLET, FILM COATED ORAL
Qty: 180 | Refills: 3 | Status: ACTIVE | COMMUNITY
Start: 2023-11-06 | End: 1900-01-01

## 2023-12-16 ENCOUNTER — RX RENEWAL (OUTPATIENT)
Age: 73
End: 2023-12-16

## 2024-01-30 ENCOUNTER — RX RENEWAL (OUTPATIENT)
Age: 74
End: 2024-01-30

## 2024-03-16 ENCOUNTER — RX RENEWAL (OUTPATIENT)
Age: 74
End: 2024-03-16

## 2024-04-29 ENCOUNTER — RX RENEWAL (OUTPATIENT)
Age: 74
End: 2024-04-29

## 2024-04-29 RX ORDER — METOPROLOL TARTRATE 50 MG/1
50 TABLET, FILM COATED ORAL
Qty: 180 | Refills: 0 | Status: ACTIVE | COMMUNITY
Start: 2023-02-04 | End: 1900-01-01

## 2024-06-02 ENCOUNTER — RX RENEWAL (OUTPATIENT)
Age: 74
End: 2024-06-02

## 2024-06-07 ENCOUNTER — RX RENEWAL (OUTPATIENT)
Age: 74
End: 2024-06-07

## 2024-06-12 ENCOUNTER — NON-APPOINTMENT (OUTPATIENT)
Age: 74
End: 2024-06-12

## 2024-06-12 RX ORDER — VALSARTAN 160 MG/1
160 TABLET, COATED ORAL
Qty: 90 | Refills: 0 | Status: ACTIVE | COMMUNITY
Start: 2023-01-25 | End: 1900-01-01

## 2024-07-29 DIAGNOSIS — I48.91 UNSPECIFIED ATRIAL FIBRILLATION: ICD-10-CM

## 2024-08-16 ENCOUNTER — APPOINTMENT (OUTPATIENT)
Dept: CARDIOLOGY | Facility: CLINIC | Age: 74
End: 2024-08-16
Payer: MEDICARE

## 2024-08-16 ENCOUNTER — NON-APPOINTMENT (OUTPATIENT)
Age: 74
End: 2024-08-16

## 2024-08-16 VITALS — DIASTOLIC BLOOD PRESSURE: 80 MMHG | SYSTOLIC BLOOD PRESSURE: 130 MMHG

## 2024-08-16 VITALS — DIASTOLIC BLOOD PRESSURE: 90 MMHG | SYSTOLIC BLOOD PRESSURE: 152 MMHG | OXYGEN SATURATION: 93 % | HEART RATE: 76 BPM

## 2024-08-16 DIAGNOSIS — K35.33 ACUTE APPENDICITIS W/ PERFORATION AND LOCALIZED PERITONITIS, W/ABSCESS: ICD-10-CM

## 2024-08-16 DIAGNOSIS — E66.01 MORBID (SEVERE) OBESITY DUE TO EXCESS CALORIES: ICD-10-CM

## 2024-08-16 DIAGNOSIS — R73.03 PREDIABETES.: ICD-10-CM

## 2024-08-16 DIAGNOSIS — I10 ESSENTIAL (PRIMARY) HYPERTENSION: ICD-10-CM

## 2024-08-16 DIAGNOSIS — I48.91 UNSPECIFIED ATRIAL FIBRILLATION: ICD-10-CM

## 2024-08-16 DIAGNOSIS — R73.9 HYPERGLYCEMIA, UNSPECIFIED: ICD-10-CM

## 2024-08-16 DIAGNOSIS — B95.5 BACTEREMIA: ICD-10-CM

## 2024-08-16 DIAGNOSIS — Z01.810 ENCOUNTER FOR PREPROCEDURAL CARDIOVASCULAR EXAMINATION: ICD-10-CM

## 2024-08-16 DIAGNOSIS — R78.81 BACTEREMIA: ICD-10-CM

## 2024-08-16 PROCEDURE — 99214 OFFICE O/P EST MOD 30 MIN: CPT

## 2024-08-16 PROCEDURE — 93000 ELECTROCARDIOGRAM COMPLETE: CPT

## 2024-08-16 PROCEDURE — G2211 COMPLEX E/M VISIT ADD ON: CPT

## 2024-08-16 NOTE — HISTORY OF PRESENT ILLNESS
[FreeTextEntry1] : Diana Ng is a 73-year-old woman with a history of Crohn's disease, atrial fibrillation, liver abscess with Streptococcus anginosus bacteremia (treated with a 6-week course of IV ceftriaxone in Spring 2021) who returns for cardiac examination -our last encounter was in January 2023.    She has no cardiac complaints.  She only describes chronic pain in both knees and difficulty losing weight.  She is essentially homebound--reports only leaving the house to see me (infrequently) and her ophthalmologist. She has difficulty ambulating.    She has not been experiencing difficulty breathing, palpitations, or chest discomfort.  She has been tolerating anticoagulation.  Blood pressure has been controlled on home monitoring.

## 2024-08-16 NOTE — CARDIOLOGY SUMMARY
[de-identified] : 8/16/2024.  Atrial fibrillation  [de-identified] : 3/16/2021.  Endocardium not well visualized; grossly normal left ventricular size and function with estimated ejection fraction 55%.  Moderate left atrial enlargement.

## 2024-08-16 NOTE — REVIEW OF SYSTEMS
[Joint Pain] : joint pain [Joint Stiffness] : joint stiffness [Anxiety] : anxiety [Under Stress] : under stress [Weight Loss (___ Lbs)] : no recent weight loss [SOB] : no shortness of breath [Chest Discomfort] : no chest discomfort [Palpitations] : no palpitations

## 2024-08-16 NOTE — DISCUSSION/SUMMARY
[With Me] : with me [___ Year(s)] : in [unfilled] year(s) [FreeTextEntry1] :  Atrial fibrillation: Permanent; Continue metoprolol and Eliquis.  Hypertension: I reviewed her home blood pressure log--controlled. Continue valsartan.   Hyperglycemia / prediabetes: Discussed carbohydrate intake (recommended reduction); Recheck hemoglobin A1c to assess for interval change since last checked in January 2023.

## 2024-08-16 NOTE — PHYSICAL EXAM
[Clear Lung Fields] : clear lung fields [Alert and Oriented] : alert and oriented [No Murmur] : no murmur [Abnormal Gait] : abnormal gait [No Edema] : no edema [Appears Anxious] : appears anxious [de-identified] :   Severe obesity [de-identified] :  irregularly irregular

## 2024-08-16 NOTE — REASON FOR VISIT
[Arrhythmia/ECG Abnorrmalities] : arrhythmia/ECG abnormalities [CV Risk Factors and Non-Cardiac Disease] : CV risk factors and non-cardiac disease [Hypertension] : hypertension

## 2024-08-27 ENCOUNTER — RX RENEWAL (OUTPATIENT)
Age: 74
End: 2024-08-27

## 2024-09-06 ENCOUNTER — RX RENEWAL (OUTPATIENT)
Age: 74
End: 2024-09-06

## 2025-04-08 ENCOUNTER — NON-APPOINTMENT (OUTPATIENT)
Age: 75
End: 2025-04-08

## 2025-05-03 ENCOUNTER — RX RENEWAL (OUTPATIENT)
Age: 75
End: 2025-05-03

## 2025-08-04 ENCOUNTER — RX RENEWAL (OUTPATIENT)
Age: 75
End: 2025-08-04

## 2025-09-15 ENCOUNTER — NON-APPOINTMENT (OUTPATIENT)
Age: 75
End: 2025-09-15

## 2025-09-17 ENCOUNTER — APPOINTMENT (OUTPATIENT)
Dept: CARDIOLOGY | Facility: CLINIC | Age: 75
End: 2025-09-17
Payer: MEDICARE

## 2025-09-17 VITALS
SYSTOLIC BLOOD PRESSURE: 150 MMHG | HEIGHT: 64 IN | DIASTOLIC BLOOD PRESSURE: 88 MMHG | OXYGEN SATURATION: 90 % | HEART RATE: 84 BPM

## 2025-09-17 DIAGNOSIS — E66.01 MORBID (SEVERE) OBESITY DUE TO EXCESS CALORIES: ICD-10-CM

## 2025-09-17 DIAGNOSIS — I48.91 UNSPECIFIED ATRIAL FIBRILLATION: ICD-10-CM

## 2025-09-17 DIAGNOSIS — R73.9 HYPERGLYCEMIA, UNSPECIFIED: ICD-10-CM

## 2025-09-17 DIAGNOSIS — I10 ESSENTIAL (PRIMARY) HYPERTENSION: ICD-10-CM

## 2025-09-17 PROCEDURE — 99214 OFFICE O/P EST MOD 30 MIN: CPT

## 2025-09-17 PROCEDURE — 93000 ELECTROCARDIOGRAM COMPLETE: CPT

## 2025-09-17 PROCEDURE — G2211 COMPLEX E/M VISIT ADD ON: CPT
